# Patient Record
Sex: MALE | Race: WHITE | NOT HISPANIC OR LATINO | URBAN - METROPOLITAN AREA
[De-identification: names, ages, dates, MRNs, and addresses within clinical notes are randomized per-mention and may not be internally consistent; named-entity substitution may affect disease eponyms.]

---

## 2023-08-11 VITALS
TEMPERATURE: 98 F | SYSTOLIC BLOOD PRESSURE: 113 MMHG | WEIGHT: 125 LBS | HEART RATE: 90 BPM | DIASTOLIC BLOOD PRESSURE: 70 MMHG | RESPIRATION RATE: 18 BRPM | OXYGEN SATURATION: 94 %

## 2023-08-11 LAB
ALBUMIN SERPL ELPH-MCNC: 3.6 G/DL — SIGNIFICANT CHANGE UP (ref 3.4–5)
ALP SERPL-CCNC: 116 U/L — SIGNIFICANT CHANGE UP (ref 40–120)
ALT FLD-CCNC: 49 U/L — HIGH (ref 12–42)
ANION GAP SERPL CALC-SCNC: 11 MMOL/L — SIGNIFICANT CHANGE UP (ref 9–16)
ANION GAP SERPL CALC-SCNC: 13 MMOL/L — SIGNIFICANT CHANGE UP (ref 9–16)
APPEARANCE UR: CLEAR — SIGNIFICANT CHANGE UP
AST SERPL-CCNC: 58 U/L — HIGH (ref 15–37)
BACTERIA # UR AUTO: ABNORMAL /HPF
BASOPHILS # BLD AUTO: 0.03 K/UL — SIGNIFICANT CHANGE UP (ref 0–0.2)
BASOPHILS NFR BLD AUTO: 0.4 % — SIGNIFICANT CHANGE UP (ref 0–2)
BILIRUB SERPL-MCNC: 0.9 MG/DL — SIGNIFICANT CHANGE UP (ref 0.2–1.2)
BILIRUB UR-MCNC: NEGATIVE — SIGNIFICANT CHANGE UP
BUN SERPL-MCNC: 46 MG/DL — HIGH (ref 7–23)
BUN SERPL-MCNC: 46 MG/DL — HIGH (ref 7–23)
CALCIUM SERPL-MCNC: 8.4 MG/DL — LOW (ref 8.5–10.5)
CALCIUM SERPL-MCNC: 9.1 MG/DL — SIGNIFICANT CHANGE UP (ref 8.5–10.5)
CHLORIDE SERPL-SCNC: 101 MMOL/L — SIGNIFICANT CHANGE UP (ref 96–108)
CHLORIDE SERPL-SCNC: 98 MMOL/L — SIGNIFICANT CHANGE UP (ref 96–108)
CO2 SERPL-SCNC: 23 MMOL/L — SIGNIFICANT CHANGE UP (ref 22–31)
CO2 SERPL-SCNC: 24 MMOL/L — SIGNIFICANT CHANGE UP (ref 22–31)
COD CRY URNS QL: SIGNIFICANT CHANGE UP
COLOR SPEC: YELLOW — SIGNIFICANT CHANGE UP
CREAT SERPL-MCNC: 1.81 MG/DL — HIGH (ref 0.5–1.3)
CREAT SERPL-MCNC: 2.15 MG/DL — HIGH (ref 0.5–1.3)
DIFF PNL FLD: ABNORMAL
EGFR: 31 ML/MIN/1.73M2 — LOW
EGFR: 38 ML/MIN/1.73M2 — LOW
EOSINOPHIL # BLD AUTO: 0 K/UL — SIGNIFICANT CHANGE UP (ref 0–0.5)
EOSINOPHIL NFR BLD AUTO: 0 % — SIGNIFICANT CHANGE UP (ref 0–6)
EPI CELLS # UR: SIGNIFICANT CHANGE UP
FLUAV AG NPH QL: SIGNIFICANT CHANGE UP
FLUBV AG NPH QL: SIGNIFICANT CHANGE UP
GLUCOSE SERPL-MCNC: 101 MG/DL — HIGH (ref 70–99)
GLUCOSE SERPL-MCNC: 133 MG/DL — HIGH (ref 70–99)
GLUCOSE UR QL: NEGATIVE MG/DL — SIGNIFICANT CHANGE UP
GRAN CASTS # UR COMP ASSIST: SIGNIFICANT CHANGE UP
HCT VFR BLD CALC: 42.3 % — SIGNIFICANT CHANGE UP (ref 39–50)
HGB BLD-MCNC: 14.1 G/DL — SIGNIFICANT CHANGE UP (ref 13–17)
HYALINE CASTS # UR AUTO: PRESENT
IMM GRANULOCYTES NFR BLD AUTO: 0.2 % — SIGNIFICANT CHANGE UP (ref 0–0.9)
KETONES UR-MCNC: NEGATIVE MG/DL — SIGNIFICANT CHANGE UP
LEUKOCYTE ESTERASE UR-ACNC: NEGATIVE — SIGNIFICANT CHANGE UP
LIDOCAIN IGE QN: 130 U/L — SIGNIFICANT CHANGE UP (ref 73–393)
LYMPHOCYTES # BLD AUTO: 1.24 K/UL — SIGNIFICANT CHANGE UP (ref 1–3.3)
LYMPHOCYTES # BLD AUTO: 15.1 % — SIGNIFICANT CHANGE UP (ref 13–44)
MAGNESIUM SERPL-MCNC: 1.8 MG/DL — SIGNIFICANT CHANGE UP (ref 1.6–2.6)
MCHC RBC-ENTMCNC: 32.2 PG — SIGNIFICANT CHANGE UP (ref 27–34)
MCHC RBC-ENTMCNC: 33.3 GM/DL — SIGNIFICANT CHANGE UP (ref 32–36)
MCV RBC AUTO: 96.6 FL — SIGNIFICANT CHANGE UP (ref 80–100)
MONOCYTES # BLD AUTO: 0.74 K/UL — SIGNIFICANT CHANGE UP (ref 0–0.9)
MONOCYTES NFR BLD AUTO: 9 % — SIGNIFICANT CHANGE UP (ref 2–14)
NEUTROPHILS # BLD AUTO: 6.2 K/UL — SIGNIFICANT CHANGE UP (ref 1.8–7.4)
NEUTROPHILS NFR BLD AUTO: 75.3 % — SIGNIFICANT CHANGE UP (ref 43–77)
NITRITE UR-MCNC: NEGATIVE — SIGNIFICANT CHANGE UP
NRBC # BLD: 0 /100 WBCS — SIGNIFICANT CHANGE UP (ref 0–0)
NT-PROBNP SERPL-SCNC: 2513 PG/ML — HIGH
PH UR: 5 — SIGNIFICANT CHANGE UP (ref 5–8)
PLATELET # BLD AUTO: 169 K/UL — SIGNIFICANT CHANGE UP (ref 150–400)
POTASSIUM SERPL-MCNC: 4.7 MMOL/L — SIGNIFICANT CHANGE UP (ref 3.5–5.3)
POTASSIUM SERPL-MCNC: 5.4 MMOL/L — HIGH (ref 3.5–5.3)
POTASSIUM SERPL-SCNC: 4.7 MMOL/L — SIGNIFICANT CHANGE UP (ref 3.5–5.3)
POTASSIUM SERPL-SCNC: 5.4 MMOL/L — HIGH (ref 3.5–5.3)
PROT SERPL-MCNC: 7.4 G/DL — SIGNIFICANT CHANGE UP (ref 6.4–8.2)
PROT UR-MCNC: 100 MG/DL
RBC # BLD: 4.38 M/UL — SIGNIFICANT CHANGE UP (ref 4.2–5.8)
RBC # FLD: 13.2 % — SIGNIFICANT CHANGE UP (ref 10.3–14.5)
RBC CASTS # UR COMP ASSIST: >10 /HPF — HIGH (ref 0–4)
RSV RNA NPH QL NAA+NON-PROBE: DETECTED
SARS-COV-2 RNA SPEC QL NAA+PROBE: SIGNIFICANT CHANGE UP
SODIUM SERPL-SCNC: 135 MMOL/L — SIGNIFICANT CHANGE UP (ref 132–145)
SODIUM SERPL-SCNC: 135 MMOL/L — SIGNIFICANT CHANGE UP (ref 132–145)
SP GR SPEC: 1.01 — SIGNIFICANT CHANGE UP (ref 1–1.03)
TRI-PHOS CRY UR QL COMP ASSIST: SIGNIFICANT CHANGE UP
TROPONIN I, HIGH SENSITIVITY RESULT: 15.9 NG/L — SIGNIFICANT CHANGE UP
URATE CRY FLD QL MICRO: SIGNIFICANT CHANGE UP
UROBILINOGEN FLD QL: 1 MG/DL — SIGNIFICANT CHANGE UP (ref 0.2–1)
WBC # BLD: 8.23 K/UL — SIGNIFICANT CHANGE UP (ref 3.8–10.5)
WBC # FLD AUTO: 8.23 K/UL — SIGNIFICANT CHANGE UP (ref 3.8–10.5)
WBC UR QL: 0 /HPF — SIGNIFICANT CHANGE UP (ref 0–5)

## 2023-08-11 PROCEDURE — 70450 CT HEAD/BRAIN W/O DYE: CPT | Mod: 26,MH

## 2023-08-11 PROCEDURE — 71045 X-RAY EXAM CHEST 1 VIEW: CPT | Mod: 26

## 2023-08-11 PROCEDURE — 99285 EMERGENCY DEPT VISIT HI MDM: CPT | Mod: FS

## 2023-08-11 RX ORDER — IPRATROPIUM/ALBUTEROL SULFATE 18-103MCG
3 AEROSOL WITH ADAPTER (GRAM) INHALATION ONCE
Refills: 0 | Status: COMPLETED | OUTPATIENT
Start: 2023-08-11 | End: 2023-08-11

## 2023-08-11 RX ORDER — SODIUM CHLORIDE 9 MG/ML
500 INJECTION INTRAMUSCULAR; INTRAVENOUS; SUBCUTANEOUS ONCE
Refills: 0 | Status: COMPLETED | OUTPATIENT
Start: 2023-08-11 | End: 2023-08-11

## 2023-08-11 NOTE — ED PROVIDER NOTE - NSICDXPASTMEDICALHX_GEN_ALL_CORE_FT
PAST MEDICAL HISTORY:  Afib     COPD, mild     HTN (hypertension)      PAST MEDICAL HISTORY:  Afib     COPD, moderate on home O2    HTN (hypertension)     Pre-diabetes     Renal cancer     Supplemental oxygen dependent

## 2023-08-11 NOTE — ED PROVIDER NOTE - ATTENDING APP SHARED VISIT CONTRIBUTION OF CARE
78-year-old man presenting with increased shortness of breath cough and malaise.  He has a history of COPD and is on home O2.  Through his ED work-up he was diagnosed with RSV.  Feels too short of breath and weak to be discharged.  Plan to admit.

## 2023-08-11 NOTE — ED PROVIDER NOTE - NS ED ATTENDING STATEMENT MOD
This was a shared visit with the DEMI. I reviewed and verified the documentation and independently performed the documented:

## 2023-08-11 NOTE — ED ADULT NURSE NOTE - OBJECTIVE STATEMENT
Reports that he felt fatigued after pushing himself to do errands. On home O2, currently on 2LNC. Placed on cardiac monitor.

## 2023-08-11 NOTE — ED ADULT NURSE NOTE - NSFALLUNIVINTERV_ED_ALL_ED
Bed/Stretcher in lowest position, wheels locked, appropriate side rails in place/Call bell, personal items and telephone in reach/Instruct patient to call for assistance before getting out of bed/chair/stretcher/Non-slip footwear applied when patient is off stretcher/[place] to call system/Physically safe environment - no spills, clutter or unnecessary equipment/Purposeful proactive rounding/Room/bathroom lighting operational, light cord in reach

## 2023-08-11 NOTE — ED PROVIDER NOTE - OBJECTIVE STATEMENT
79 y/o Male with PMHx of Afib(on Eliquis and metoprolol), HLD, COPD(on 2L nasal canula), and HTN presenting today c/o feeling weak and having episode of dizziness for the past week. Patient states that he's been having on and off episodes of feeling weak. Patient also reports cough with yellow sputum and runny nose for the past couple of days. Patient endorses increasing episodes of SOB mainly when exercising. Patient states that the symptoms started after the 90 degree whether last week but states that the symptoms haven't improved. Denies HA, chest pain, recent travel or sick contact, and fever. Patient states he didn't go from a sitting to standing position while the dizziness episode was occurring and describes the dizziness sensation  as room spinning. Patient states that the dizziness has improved while in the ED. 77 y/o Male with PMHx of Afib(on Eliquis and metoprolol), HLD, COPD (on 2L nasal canula), and HTN presenting today c/o feeling weak and having episode of dizziness for the past week. Patient states that he's been having on and off episodes of feeling weak. Patient also reports cough with yellow sputum and runny nose for the past couple of days. Patient endorses increasing episodes of SOB mainly when exercising. Patient states that the symptoms started after the 90 degree whether last week but states that the symptoms haven't improved. Denies HA, chest pain, recent travel or sick contact, and fever. Patient states he didn't go from a sitting to standing position while the dizziness episode was occurring and describes the dizziness sensation  as room spinning. Patient states that the dizziness has improved while in the ED.

## 2023-08-12 ENCOUNTER — INPATIENT (INPATIENT)
Facility: HOSPITAL | Age: 78
LOS: 3 days | Discharge: NURSING, REHAB OR LONG-TERM CARE | DRG: 152 | End: 2023-08-16
Attending: INTERNAL MEDICINE | Admitting: STUDENT IN AN ORGANIZED HEALTH CARE EDUCATION/TRAINING PROGRAM
Payer: MEDICARE

## 2023-08-12 DIAGNOSIS — R53.1 WEAKNESS: ICD-10-CM

## 2023-08-12 DIAGNOSIS — R31.9 HEMATURIA, UNSPECIFIED: ICD-10-CM

## 2023-08-12 DIAGNOSIS — B33.8 OTHER SPECIFIED VIRAL DISEASES: ICD-10-CM

## 2023-08-12 DIAGNOSIS — E78.5 HYPERLIPIDEMIA, UNSPECIFIED: ICD-10-CM

## 2023-08-12 DIAGNOSIS — Z90.5 ACQUIRED ABSENCE OF KIDNEY: Chronic | ICD-10-CM

## 2023-08-12 DIAGNOSIS — Z29.9 ENCOUNTER FOR PROPHYLACTIC MEASURES, UNSPECIFIED: ICD-10-CM

## 2023-08-12 DIAGNOSIS — I10 ESSENTIAL (PRIMARY) HYPERTENSION: ICD-10-CM

## 2023-08-12 DIAGNOSIS — I48.91 UNSPECIFIED ATRIAL FIBRILLATION: ICD-10-CM

## 2023-08-12 DIAGNOSIS — N17.9 ACUTE KIDNEY FAILURE, UNSPECIFIED: ICD-10-CM

## 2023-08-12 DIAGNOSIS — N40.0 BENIGN PROSTATIC HYPERPLASIA WITHOUT LOWER URINARY TRACT SYMPTOMS: ICD-10-CM

## 2023-08-12 DIAGNOSIS — J44.9 CHRONIC OBSTRUCTIVE PULMONARY DISEASE, UNSPECIFIED: ICD-10-CM

## 2023-08-12 LAB
ALBUMIN SERPL ELPH-MCNC: 4 G/DL — SIGNIFICANT CHANGE UP (ref 3.3–5)
ALP SERPL-CCNC: 101 U/L — SIGNIFICANT CHANGE UP (ref 40–120)
ALT FLD-CCNC: 33 U/L — SIGNIFICANT CHANGE UP (ref 10–45)
ANION GAP SERPL CALC-SCNC: 11 MMOL/L — SIGNIFICANT CHANGE UP (ref 5–17)
ANION GAP SERPL CALC-SCNC: 13 MMOL/L — SIGNIFICANT CHANGE UP (ref 5–17)
ANISOCYTOSIS BLD QL: SLIGHT — SIGNIFICANT CHANGE UP
AST SERPL-CCNC: 42 U/L — HIGH (ref 10–40)
BASOPHILS # BLD AUTO: 0 K/UL — SIGNIFICANT CHANGE UP (ref 0–0.2)
BASOPHILS NFR BLD AUTO: 0 % — SIGNIFICANT CHANGE UP (ref 0–2)
BILIRUB SERPL-MCNC: 0.6 MG/DL — SIGNIFICANT CHANGE UP (ref 0.2–1.2)
BUN SERPL-MCNC: 44 MG/DL — HIGH (ref 7–23)
BUN SERPL-MCNC: 48 MG/DL — HIGH (ref 7–23)
BURR CELLS BLD QL SMEAR: PRESENT — SIGNIFICANT CHANGE UP
CALCIUM SERPL-MCNC: 8.8 MG/DL — SIGNIFICANT CHANGE UP (ref 8.4–10.5)
CALCIUM SERPL-MCNC: 9.3 MG/DL — SIGNIFICANT CHANGE UP (ref 8.4–10.5)
CHLORIDE SERPL-SCNC: 101 MMOL/L — SIGNIFICANT CHANGE UP (ref 96–108)
CHLORIDE SERPL-SCNC: 102 MMOL/L — SIGNIFICANT CHANGE UP (ref 96–108)
CO2 SERPL-SCNC: 21 MMOL/L — LOW (ref 22–31)
CO2 SERPL-SCNC: 24 MMOL/L — SIGNIFICANT CHANGE UP (ref 22–31)
CREAT SERPL-MCNC: 1.49 MG/DL — HIGH (ref 0.5–1.3)
CREAT SERPL-MCNC: 1.49 MG/DL — HIGH (ref 0.5–1.3)
EGFR: 48 ML/MIN/1.73M2 — LOW
EGFR: 48 ML/MIN/1.73M2 — LOW
EOSINOPHIL # BLD AUTO: 0 K/UL — SIGNIFICANT CHANGE UP (ref 0–0.5)
EOSINOPHIL NFR BLD AUTO: 0 % — SIGNIFICANT CHANGE UP (ref 0–6)
GIANT PLATELETS BLD QL SMEAR: PRESENT — SIGNIFICANT CHANGE UP
GLUCOSE SERPL-MCNC: 178 MG/DL — HIGH (ref 70–99)
GLUCOSE SERPL-MCNC: 247 MG/DL — HIGH (ref 70–99)
HCT VFR BLD CALC: 42.3 % — SIGNIFICANT CHANGE UP (ref 39–50)
HGB BLD-MCNC: 14.6 G/DL — SIGNIFICANT CHANGE UP (ref 13–17)
LYMPHOCYTES # BLD AUTO: 0.77 K/UL — LOW (ref 1–3.3)
LYMPHOCYTES # BLD AUTO: 13.9 % — SIGNIFICANT CHANGE UP (ref 13–44)
MACROCYTES BLD QL: SLIGHT — SIGNIFICANT CHANGE UP
MAGNESIUM SERPL-MCNC: 1.9 MG/DL — SIGNIFICANT CHANGE UP (ref 1.6–2.6)
MANUAL SMEAR VERIFICATION: SIGNIFICANT CHANGE UP
MCHC RBC-ENTMCNC: 33 PG — SIGNIFICANT CHANGE UP (ref 27–34)
MCHC RBC-ENTMCNC: 34.5 GM/DL — SIGNIFICANT CHANGE UP (ref 32–36)
MCV RBC AUTO: 95.5 FL — SIGNIFICANT CHANGE UP (ref 80–100)
MONOCYTES # BLD AUTO: 0.05 K/UL — SIGNIFICANT CHANGE UP (ref 0–0.9)
MONOCYTES NFR BLD AUTO: 0.9 % — LOW (ref 2–14)
NEUTROPHILS # BLD AUTO: 4.74 K/UL — SIGNIFICANT CHANGE UP (ref 1.8–7.4)
NEUTROPHILS NFR BLD AUTO: 85.2 % — HIGH (ref 43–77)
OVALOCYTES BLD QL SMEAR: SLIGHT — SIGNIFICANT CHANGE UP
PHOSPHATE SERPL-MCNC: 3.8 MG/DL — SIGNIFICANT CHANGE UP (ref 2.5–4.5)
PLAT MORPH BLD: ABNORMAL
PLATELET # BLD AUTO: 158 K/UL — SIGNIFICANT CHANGE UP (ref 150–400)
POIKILOCYTOSIS BLD QL AUTO: SIGNIFICANT CHANGE UP
POTASSIUM SERPL-MCNC: 4.9 MMOL/L — SIGNIFICANT CHANGE UP (ref 3.5–5.3)
POTASSIUM SERPL-MCNC: 5.6 MMOL/L — HIGH (ref 3.5–5.3)
POTASSIUM SERPL-SCNC: 4.9 MMOL/L — SIGNIFICANT CHANGE UP (ref 3.5–5.3)
POTASSIUM SERPL-SCNC: 5.6 MMOL/L — HIGH (ref 3.5–5.3)
PROT SERPL-MCNC: 6.6 G/DL — SIGNIFICANT CHANGE UP (ref 6–8.3)
RBC # BLD: 4.43 M/UL — SIGNIFICANT CHANGE UP (ref 4.2–5.8)
RBC # FLD: 13 % — SIGNIFICANT CHANGE UP (ref 10.3–14.5)
RBC BLD AUTO: ABNORMAL
SODIUM SERPL-SCNC: 135 MMOL/L — SIGNIFICANT CHANGE UP (ref 135–145)
SODIUM SERPL-SCNC: 137 MMOL/L — SIGNIFICANT CHANGE UP (ref 135–145)
WBC # BLD: 5.56 K/UL — SIGNIFICANT CHANGE UP (ref 3.8–10.5)
WBC # FLD AUTO: 5.56 K/UL — SIGNIFICANT CHANGE UP (ref 3.8–10.5)

## 2023-08-12 PROCEDURE — 99223 1ST HOSP IP/OBS HIGH 75: CPT | Mod: GC

## 2023-08-12 RX ORDER — METOPROLOL TARTRATE 50 MG
5 TABLET ORAL ONCE
Refills: 0 | Status: COMPLETED | OUTPATIENT
Start: 2023-08-12 | End: 2023-08-12

## 2023-08-12 RX ORDER — METOPROLOL TARTRATE 50 MG
1 TABLET ORAL
Refills: 0 | DISCHARGE

## 2023-08-12 RX ORDER — TAMSULOSIN HYDROCHLORIDE 0.4 MG/1
1 CAPSULE ORAL
Refills: 0 | DISCHARGE

## 2023-08-12 RX ORDER — METOPROLOL TARTRATE 50 MG
25 TABLET ORAL EVERY 12 HOURS
Refills: 0 | Status: DISCONTINUED | OUTPATIENT
Start: 2023-08-12 | End: 2023-08-16

## 2023-08-12 RX ORDER — IPRATROPIUM/ALBUTEROL SULFATE 18-103MCG
3 AEROSOL WITH ADAPTER (GRAM) INHALATION EVERY 6 HOURS
Refills: 0 | Status: DISCONTINUED | OUTPATIENT
Start: 2023-08-12 | End: 2023-08-16

## 2023-08-12 RX ORDER — TAMSULOSIN HYDROCHLORIDE 0.4 MG/1
0.4 CAPSULE ORAL AT BEDTIME
Refills: 0 | Status: DISCONTINUED | OUTPATIENT
Start: 2023-08-12 | End: 2023-08-16

## 2023-08-12 RX ORDER — APIXABAN 2.5 MG/1
1 TABLET, FILM COATED ORAL
Refills: 0 | DISCHARGE

## 2023-08-12 RX ORDER — IPRATROPIUM/ALBUTEROL SULFATE 18-103MCG
3 AEROSOL WITH ADAPTER (GRAM) INHALATION EVERY 6 HOURS
Refills: 0 | Status: DISCONTINUED | OUTPATIENT
Start: 2023-08-12 | End: 2023-08-12

## 2023-08-12 RX ORDER — APIXABAN 2.5 MG/1
2.5 TABLET, FILM COATED ORAL EVERY 12 HOURS
Refills: 0 | Status: DISCONTINUED | OUTPATIENT
Start: 2023-08-12 | End: 2023-08-16

## 2023-08-12 RX ORDER — SODIUM ZIRCONIUM CYCLOSILICATE 10 G/10G
10 POWDER, FOR SUSPENSION ORAL ONCE
Refills: 0 | Status: COMPLETED | OUTPATIENT
Start: 2023-08-12 | End: 2023-08-12

## 2023-08-12 RX ORDER — ATORVASTATIN CALCIUM 80 MG/1
1 TABLET, FILM COATED ORAL
Refills: 0 | DISCHARGE

## 2023-08-12 RX ORDER — SODIUM CHLORIDE 9 MG/ML
1000 INJECTION INTRAMUSCULAR; INTRAVENOUS; SUBCUTANEOUS
Refills: 0 | Status: DISCONTINUED | OUTPATIENT
Start: 2023-08-12 | End: 2023-08-14

## 2023-08-12 RX ORDER — ATORVASTATIN CALCIUM 80 MG/1
80 TABLET, FILM COATED ORAL AT BEDTIME
Refills: 0 | Status: DISCONTINUED | OUTPATIENT
Start: 2023-08-12 | End: 2023-08-14

## 2023-08-12 RX ADMIN — Medication 25 MILLIGRAM(S): at 12:07

## 2023-08-12 RX ADMIN — SODIUM CHLORIDE 500 MILLILITER(S): 9 INJECTION INTRAMUSCULAR; INTRAVENOUS; SUBCUTANEOUS at 00:09

## 2023-08-12 RX ADMIN — Medication 100 MILLIGRAM(S): at 22:06

## 2023-08-12 RX ADMIN — Medication 25 MILLIGRAM(S): at 23:07

## 2023-08-12 RX ADMIN — SODIUM CHLORIDE 80 MILLILITER(S): 9 INJECTION INTRAMUSCULAR; INTRAVENOUS; SUBCUTANEOUS at 12:06

## 2023-08-12 RX ADMIN — SODIUM ZIRCONIUM CYCLOSILICATE 10 GRAM(S): 10 POWDER, FOR SUSPENSION ORAL at 12:58

## 2023-08-12 RX ADMIN — Medication 5 MILLIGRAM(S): at 05:17

## 2023-08-12 RX ADMIN — TAMSULOSIN HYDROCHLORIDE 0.4 MILLIGRAM(S): 0.4 CAPSULE ORAL at 22:07

## 2023-08-12 RX ADMIN — ATORVASTATIN CALCIUM 80 MILLIGRAM(S): 80 TABLET, FILM COATED ORAL at 22:06

## 2023-08-12 RX ADMIN — Medication 3 MILLILITER(S): at 00:05

## 2023-08-12 RX ADMIN — APIXABAN 2.5 MILLIGRAM(S): 2.5 TABLET, FILM COATED ORAL at 12:06

## 2023-08-12 RX ADMIN — Medication 125 MILLIGRAM(S): at 00:05

## 2023-08-12 RX ADMIN — APIXABAN 2.5 MILLIGRAM(S): 2.5 TABLET, FILM COATED ORAL at 22:06

## 2023-08-12 NOTE — H&P ADULT - PROBLEM SELECTOR PLAN 3
- Eliquis 2.5mg BID  - metoprolol tartrate 25mg BID on home 2LNC, no evidence of hypoxia.  - ramiro PRN UA >10 RBC and large blood in urine likely in the setting of previous renal cancer. Pt is not anemic so there is no indication to hold AC at this time.   - Trend CBC   - Continue w/ home Eliquis 2.5 mg BID

## 2023-08-12 NOTE — H&P ADULT - ATTENDING COMMENTS
Patient is 79 yo man with A. Fib on AC, COPD on 2 L of Oxygen via NC, HTN, Renal Ca s/p partial R nephrectomy, BPH, admitted with complains of fatigue and decreased PO intake. In ED pt was hypotensive to 110/70 and improved with NS bolus to 150/80. Patient appears thin with decreased muscle muss. Not in respiratory distress with clear to ausculation lungs but decreased air entry. CXR was clear. EKG with A. Fib. RVP returned positive for RSV.  ----for asymptomatic RSV will cont with supportive care. No Patient is 79 yo man with A. Fib on AC, COPD on 2 L of Oxygen via NC, HTN, Renal Ca s/p partial R nephrectomy, BPH, admitted with complains of fatigue and decreased PO intake. In ED pt was hypotensive to 110/70 and improved with NS bolus to 150/80. Patient appears thin with decreased muscle muss. Not in respiratory distress with clear to ausculation lungs but decreased air entry. CXR was clear. EKG with A. Fib. RVP returned positive for RSV. Labs significant for elevated BUN/Creat.   ----for asymptomatic RSV will cont with supportive care. No need for systemic steroid therapy  ---hypoxic respiratory failure due to COPD, cont with supplemental oxygen and bronchodilators  ----for BONNY, will r/out post-renal cause with bladder scan given history of BPH. Cont daily tamsulosin However pt improved with IV bolus thus suspect more pre-renal etiology. Given patient's hesitancy with drinking water will cont with IV infusion for another 24 hours.   ----A. Fib on AC and beta blockers. Patient noted to have hematuria but HgB is stable; likely has to do with history of renal cancer that needs to be followed as outpatient  ----PT assessment prior to discharge likely tomorrow

## 2023-08-12 NOTE — H&P ADULT - PROBLEM SELECTOR PLAN 6
- tamsulosin 0.4mg daily - atorvastatin 80mg daily Known PMH AFib, rate controlled.  - continue home Eliquis 2.5mg BID  - continue home metoprolol tartrate 25mg BID

## 2023-08-12 NOTE — H&P ADULT - NSHPPHYSICALEXAM_GEN_ALL_CORE
General: in no acute distress  Eyes: EOMI intact bilaterally. Anicteric sclerae, moist conjunctivae  HENT: Moist mucous membranes  Neck: Trachea midline, supple  Lungs: CTA B/L. No wheezes, rales, or rhonchi  Cardiovascular: RRR. No murmurs, rubs, or gallops  Abdomen: Soft, non-tender non-distended; No rebound or guarding  Extremities: WWP, No clubbing, cyanosis or edema  MSK: No midline bony tenderness. No CVA tenderness bilaterally  Neurological: Alert and oriented x3  Skin: Warm and dry. No obvious rash no General: in no acute distress  Eyes: EOMI intact bilaterally; Anicteric sclerae  HENT: Dry mucous membranes  Neck: Trachea midline, supple  Lungs: CTA B/L. No wheezes, rales, or rhonchi  Cardiovascular: RRR. No murmurs, rubs, or gallops  Abdomen: Soft, non-tender non-distended; No rebound or guarding  Extremities: +calf tenderness b/l; No clubbing, cyanosis or edema  MSK: +CVA tenderness bilaterally  Neurological: Alert and oriented x3  Skin: Warm and dry. No obvious rash General: in no acute distress  Eyes: EOMI intact bilaterally; Anicteric sclerae  HENT: Dry mucous membranes  Neck: Trachea midline, supple  Lungs: CTA B/L. No wheezes, rales, or rhonchi  Cardiovascular: RRR. No murmurs, rubs, or gallops  Abdomen: Soft, non-tender non-distended; No rebound or guarding  Extremities: no calf tenderness b/l; No clubbing, cyanosis or edema  MSK: no CVA tenderness bilaterally  Neurological: Alert and oriented x3  Skin: Warm and dry. No obvious rash General: in no acute distress  Eyes: EOMI intact bilaterally; Anicteric sclerae  HENT: Dry mucous membranes  Neck: Trachea midline, supple  Lungs: Diminished lung sounds, CTA B/L. No wheezes, rales, or rhonchi  Cardiovascular: RRR. No murmurs, rubs, or gallops  Abdomen: Soft, non-tender non-distended; No rebound or guarding  Extremities: no calf tenderness b/l; No clubbing, cyanosis or edema  MSK: no CVA tenderness bilaterally  Neurological: Alert and oriented x3  Skin: Warm and dry. No obvious rash

## 2023-08-12 NOTE — H&P ADULT - PROBLEM SELECTOR PLAN 4
- holding losartan 50mg daily ISO BONNY - Eliquis 2.5mg BID  - metoprolol tartrate 25mg BID History of one week of weakness and fatigue. Pt appears malnourished. States he completes ADLs w/o assistance. Uses a shopping cart to help steady his walking when doing errands.   - Encourage PO intake   - IVF  - PT consult

## 2023-08-12 NOTE — H&P ADULT - NSHPREVIEWOFSYSTEMS_GEN_ALL_CORE
REVIEW OF SYSTEMS:    CONSTITUTIONAL: +weakness, no fevers or chills  EYES/ENT: +dizziness, +visual changes;  No vertigo or throat pain   NECK: No pain or stiffness  RESPIRATORY: +cough, no wheezing, hemoptysis; No shortness of breath  CARDIOVASCULAR: No chest pain or palpitations  GASTROINTESTINAL: No abdominal or epigastric pain. No nausea, vomiting, or hematemesis; No diarrhea or constipation. No melena or hematochezia.  GENITOURINARY: No dysuria, frequency or hematuria  NEUROLOGICAL: No numbness or weakness  SKIN: No itching, rashes

## 2023-08-12 NOTE — H&P ADULT - PROBLEM SELECTOR PLAN 2
BUN/Cr 46, 2.15 on admission. Downtrending s/p 500cc NS in ED.  suspect pre-renal in nature. no known kidney disease, however, UA showing protein and RBC/blood.  - trend Cr  - urine studies to calculate FeNa  - encourage PO intake  - avoid nephrotoxic drugs, renally dose medications  - hold losartan ISO BONNY BUN/Cr 46, 2.15 on admission. History of renal cancer and rt partial nephrectomy. Downtrending s/p 500cc NS in ED.  suspect pre-renal in nature. no known kidney disease, however, UA showing protein and RBC/blood.  - trend Cr  - urine studies to calculate FeNa  - encourage PO intake  - avoid nephrotoxic drugs, renally dose medications BUN/Cr 46, 2.15 on admission. History of renal cancer and rt partial nephrectomy. Downtrending (2.15 --> 1.81) s/p 500cc NS in ED.  suspect pre-renal in nature. no known kidney disease, however, UA showing protein and RBC/blood.  - trend Cr  - bladder scan to r/o obstruction  - NS 80 cc/hr for 24 hr  - encourage PO intake  - avoid nephrotoxic drugs, renally dose medications BUN/Cr 46, 2.15 on admission. History of renal cancer and rt partial nephrectomy. Downtrending (2.15 --> 1.81) s/p 500cc NS in ED.  suspect pre-renal in nature. no known kidney disease, however, UA showing protein and RBC/blood.   - trend Cr  - bladder scan to r/o obstruction  - NS 80 cc/hr for 24 hr  - encourage PO intake  - avoid nephrotoxic drugs, renally dose medications

## 2023-08-12 NOTE — ED ADULT NURSE REASSESSMENT NOTE - NS ED NURSE REASSESS COMMENT FT1
Pt received from ANGELA Cruz. Pt ambulated to bathroom with steady gait. Placed on continuos cardiac monitoring and pulse oximetry. Maintained on 3L NC. Pending transfer to [facility].

## 2023-08-12 NOTE — H&P ADULT - NSHPLABSRESULTS_GEN_ALL_CORE
LABS:                         14.1   8.23  >-----< 169           ( 08-11-23 @ 14:53 )             42.3       135  |  101  |   46  ----------------------< 101    (08-11-23 @ 21:58)     4.7  |  23  |  1.81    Anion Gap: 11  ,   135  |  98  |   46  ----------------------< 133    (08-11-23 @ 14:53)     5.4  |  24  |  2.15    Anion Gap: 13    Ca   8.4   (08-11-23 @ 21:58)  Mg   x    (08-11-23 @ 21:58)  Phos x    (08-11-23 @ 21:58)       TP 8.4     |  AST x     -------------------------     Alb x     |  ALT x               (08-11-23 @ 21:58)  -------------------------     T-bili x   |  AlkPh x     D-bili x          Urinalysis Basic - ( 11 Aug 2023 21:58 )    Color: x / Appearance: x / SG: x / pH: x  Gluc: 101 mg/dL / Ketone: x  / Bili: x / Urobili: x   Blood: x / Protein: x / Nitrite: x   Leuk Esterase: x / RBC: x / WBC x   Sq Epi: x / Non Sq Epi: x / Bacteria: x      I/O SUMMARY:

## 2023-08-12 NOTE — H&P ADULT - ASSESSMENT
incomplete note 77 y/o Male with PMHx of Afib(on Eliquis and metoprolol), HLD, COPD (on 2L nasal canula), renal cancer s/p partial rt nephrectomy and HTN presenting today c/o feeling weak for the past week admitted for further workup for weakness and management of RSV.

## 2023-08-12 NOTE — H&P ADULT - HISTORY OF PRESENT ILLNESS
77 y/o Male with PMHx of Afib(on Eliquis and metoprolol), HLD, COPD (on 2L nasal canula), and HTN presenting today c/o feeling weak and having episode of dizziness for the past week. Patient states that he's been having on and off episodes of feeling weak. Patient also reports cough with yellow sputum and runny nose for the past couple of days. Patient endorses increasing episodes of SOB mainly when exercising. Patient states that the symptoms started after the 90 degree whether last week but states that the symptoms haven't improved. Denies HA, chest pain, recent travel or sick contact, and fever. Patient states he didn't go from a sitting to standing position while the dizziness episode was occurring and describes the dizziness sensation  as room spinning. Patient states that the dizziness has improved while in the ED. 77 y/o Male with PMHx of Afib(on Eliquis and metoprolol), HLD, COPD (on 2L nasal canula), and HTN presenting today c/o feeling weak and having episode of dizziness for the past week. Patient states that he's been having on and off episodes of feeling weak. Patient also reports cough with yellow sputum and runny nose for the past couple of days. Patient endorses increasing episodes of SOB mainly when exercising. Patient states that the symptoms started after the 90 degree whether last week but states that the symptoms haven't improved. Denies HA, chest pain, recent travel or sick contact, and fever. Patient states he didn't go from a sitting to standing position while the dizziness episode was occurring and describes the dizziness sensation  as room spinning. Patient states that the dizziness has improved while in the ED.    In the ED:    - VS: Afebrile, HR: 90, BP: 113/70, RR: 18, O2: 94% on 2LNC     - Pertinent Labs: wbc 8.2, K 5.4 ->4.7, BUN 46, Cr 2.15, AST 58, ALT 49, trop I neg, pro-BNP 2513. UA = large blood, >10rbc, 100 protein    - Imaging:  ·	CT head:   ·	No acute intracranial hemorrhage, mass effect or demarcated territorial infarction  ·	CXR:   ·	Hyperaeration with emphysematous changes involving the upper lung zones (R>L)  ·	Pleural thickening and retraction of the right minor fissure consistent with RUL volume loss.    - Treatment/interventions: 500cc NS, lopressor 5mg IV, solumedrol 125mg, duoneb x1 79 y/o Male with PMHx of Afib(on Eliquis and metoprolol), HLD, COPD (on 2L nasal canula), renal cancer s/p partial rt nephrectomy and HTN presenting today c/o feeling weak for the past week. Patient states that he's been having on and off episodes of feeling weak since last Saturday 8/5. He was doing errands when the weakness began. States he has been eating and drinking water and tea regularly. States that he has samples of Trelegy and used a sample on Thursday evening after which he felt a build up of phlegm in his throat. Has had to use his home 02 more often and has had a cough with yellow sputum since then. On Friday he ate breakfast and drank tea before going to [employer] around 11 AM where he felt sick and had to sit down. Upon standing up he states he felt dizzy and while looking at the park "saw blurry images." The dizziness persisted for a half an hour after which he went to the hospital for further workup. Was wearing his 02 at the time. Denies fevers/chill, n/v, chest pain, abdomina pain and diarrhea. States that his symptoms have improved upon coming to the hospital.     In the ED:    - VS: Afebrile, HR: 90, BP: 113/70, RR: 18, O2: 94% on 2LNC     - Pertinent Labs: wbc 8.2, K 5.4 ->4.7, BUN 46, Cr 2.15, AST 58, ALT 49, trop I neg, pro-BNP 2513. UA = large blood, >10rbc, 100 protein    - Imaging:  ·	CT head:   ·	No acute intracranial hemorrhage, mass effect or demarcated territorial infarction  ·	CXR:   ·	Hyperaeration with emphysematous changes involving the upper lung zones (R>L)  ·	Pleural thickening and retraction of the right minor fissure consistent with RUL volume loss.    - Treatment/interventions: 500cc NS, lopressor 5mg IV, solumedrol 125mg, duoneb x1 79 y/o Male with PMHx of Afib(on Eliquis and metoprolol), HLD, COPD (on 2L nasal canula), renal cancer s/p partial rt nephrectomy and HTN presenting d/t feeling weak for the past week. Patient states that he's been having on and off episodes of feeling weak since last Saturday 8/5. He was doing errands when the weakness began. States he has been eating and drinking water and tea regularly. States that he has samples of Trelegy and used a sample on Thursday evening after which he felt a build up of phlegm in his throat. Has had to use his home 02 more often and has had a cough with yellow sputum since then. On Friday he ate breakfast and drank tea before going to [employer] around 11 AM where he felt sick and had to sit down. Upon standing up he states he felt dizzy and while looking at the park "saw blurry images." The dizziness persisted for a half an hour after which he went to the hospital for further workup. Was wearing his 02 at the time. Denies fevers/chill, n/v, chest pain, abdomina pain and diarrhea. States that his symptoms have improved upon coming to the hospital.     In the ED:    - VS: Afebrile, HR: 90, BP: 113/70, RR: 18, O2: 94% on 2LNC     - Pertinent Labs: wbc 8.2, K 5.4 ->4.7, BUN 46, Cr 2.15, AST 58, ALT 49, trop I neg, pro-BNP 2513. UA = large blood, >10rbc, 100 protein    - Imaging:  ·	CT head:   ·	No acute intracranial hemorrhage, mass effect or demarcated territorial infarction  ·	CXR:   ·	Hyperaeration with emphysematous changes involving the upper lung zones (R>L)  ·	Pleural thickening and retraction of the right minor fissure consistent with RUL volume loss.    - Treatment/interventions: 500cc NS, lopressor 5mg IV, solumedrol 125mg, duoneb x1 79 y/o Male with PMHx of Afib(on Eliquis and metoprolol), HLD, COPD (on 2L nasal canula), renal cancer s/p partial rt nephrectomy and HTN presenting d/t feeling weak for the past week. Patient states that he's been having on and off episodes of feeling weak since last Saturday 8/5. He was doing errands when the weakness began. States he has been eating and drinking water and tea regularly. States that he has samples of Trelegy and used a sample on Thursday evening after which he felt a build up of phlegm in his throat. Has had to use his home 02 more often and has had a cough with yellow sputum since then. On Friday he ate breakfast and drank tea before going to [employer] around 11 AM where he felt sick and had to sit down. Upon standing up he states he felt dizzy and while looking at the park "saw blurry images." The dizziness persisted for a half an hour after which he went to the hospital for further workup. Was wearing his 02 at the time. Denies fevers/chill, n/v, orthopnea, chest pain, abdomina pain and diarrhea. States that his symptoms have improved upon coming to the hospital.     In the ED:    - VS: Afebrile, HR: 90, BP: 113/70, RR: 18, O2: 94% on 2LNC     - Pertinent Labs: wbc 8.2, K 5.4 ->4.7, BUN 46, Cr 2.15, AST 58, ALT 49, trop I neg, pro-BNP 2513. UA = large blood, >10rbc, 100 protein    - Imaging:  ·	CT head:   ·	No acute intracranial hemorrhage, mass effect or demarcated territorial infarction  ·	CXR:   ·	Hyperaeration with emphysematous changes involving the upper lung zones (R>L)  ·	Pleural thickening and retraction of the right minor fissure consistent with RUL volume loss.    - Treatment/interventions: 500cc NS, lopressor 5mg IV, solumedrol 125mg, duoneb x1 77 y/o Male with PMHx of Afib(on Eliquis and metoprolol), HLD, COPD (on 2L nasal canula), renal cancer s/p partial rt nephrectomy and HTN presenting d/t feeling weak for the past week. Patient states that he's been having on and off episodes of feeling weak since last Saturday 8/5. He was doing errands when the weakness began. States he has been eating and drinking water and tea regularly. States that he has samples of Trelegy and used a sample on Thursday evening after which he felt a build up of phlegm in his throat. Has had increased SOB on exertion and has used his home 02 more often. Also has had a cough with yellow sputum since then. On Friday he ate breakfast and drank tea before going to [employer] around 11 AM where he felt sick and had to sit down. Upon standing up he states he felt dizzy and while looking at the park "saw blurry images." The dizziness persisted for a half an hour after which he went to the hospital for further workup. Was wearing his 02 at the time. Denies fevers/chill, n/v, orthopnea, chest pain, abdomina pain and diarrhea. States that his symptoms have improved upon coming to the hospital.     In the ED:    - VS: Afebrile, HR: 90, BP: 113/70, RR: 18, O2: 94% on 2LNC     - Pertinent Labs: wbc 8.2, K 5.4 ->4.7, BUN 46, Cr 2.15, AST 58, ALT 49, trop I neg, pro-BNP 2513. UA = large blood, >10rbc, 100 protein    - Imaging:  ·	CT head:   ·	No acute intracranial hemorrhage, mass effect or demarcated territorial infarction  ·	CXR:   ·	Hyperaeration with emphysematous changes involving the upper lung zones (R>L)  ·	Pleural thickening and retraction of the right minor fissure consistent with RUL volume loss.    - Treatment/interventions: 500cc NS, lopressor 5mg IV, solumedrol 125mg, duoneb x1 77 y/o Male with PMHx of Afib(on Eliquis and metoprolol), HLD, COPD (on 2L nasal canula), renal cancer s/p partial rt nephrectomy and HTN presenting d/t feeling weak for the past week. Patient states that he's been having on and off episodes of feeling weak since last Saturday 8/5. He was doing errands when the weakness began. States he has been eating and drinking water and tea regularly. States that he has samples of Trelegy and used a sample on Thursday evening after which he felt a build up of phlegm in his throat. Has had increased SOB on exertion and has used his home 02 more often. Also has had a cough with yellow sputum since then. On Friday he ate breakfast and drank tea before going to [employer] around 11 AM where he felt sick and had to sit down. Upon standing up he states he felt dizzy and while looking at the park "saw blurry images." The dizziness persisted for a half an hour after which he went to the hospital for further workup. Was wearing his 02 at the time. Denies fevers/chill, n/v, orthopnea, chest pain, abdomina pain and diarrhea. States that his symptoms have improved upon coming to the hospital.     In the ED:    - VS: Afebrile, HR: 90, BP: 113/70, RR: 18, O2: 94% on 2LNC     - Pertinent Labs: wbc 8.2, K 5.4 ->4.7, BUN 46, Cr 2.15, AST 58, ALT 49, trop I neg, pro-BNP 2513. UA = large blood, >10rbc, 100 protein, RSV +    - Imaging:  ·	CT head:   ·	No acute intracranial hemorrhage, mass effect or demarcated territorial infarction  ·	CXR:   ·	Hyperaeration with emphysematous changes involving the upper lung zones (R>L)  ·	Pleural thickening and retraction of the right minor fissure consistent with RUL volume loss.    - Treatment/interventions: 500cc NS, lopressor 5mg IV, solumedrol 125mg, duoneb x1 77 y/o Male with PMHx of Afib (Eliquis), HLD, COPD (home 2LNC), renal CA (s/p partial rt nephrectomy "within last year," in remission) and HTN presenting d/t feeling weak for the past week. Patient states that he's been having on and off episodes of feeling weak since last Saturday 8/5. He was doing errands when the weakness began. States he has been eating and drinking water and tea regularly. States that he has samples of Trelegy and used a sample on Thursday evening after which he felt a build up of phlegm in his throat. Has had increased SOB on exertion and has used his home 02 more often. Also has had a cough with yellow sputum since then. On Friday he ate breakfast and drank tea before going to [employer] around 11 AM where he felt sick and had to sit down. Upon standing up he states he felt dizzy and while looking at the park "saw blurry images." The dizziness persisted for a half an hour after which he went to the hospital for further workup. Was wearing his 02 at the time. Denies fevers/chill, n/v, orthopnea, chest pain, abdomina pain and diarrhea. States that his symptoms have improved upon coming to the hospital.     In the ED:    - VS: Afebrile, HR: 90, BP: 113/70, RR: 18, O2: 94% on 2LNC     - Pertinent Labs: wbc 8.2, K 5.4 ->4.7, BUN 46, Cr 2.15, AST 58, ALT 49, trop I neg, pro-BNP 2513. UA = large blood, >10rbc, 100 protein, RSV +    - Imaging:  ·	CT head:   ·	No acute intracranial hemorrhage, mass effect or demarcated territorial infarction  ·	CXR:   ·	Hyperaeration with emphysematous changes involving the upper lung zones (R>L)  ·	Pleural thickening and retraction of the right minor fissure consistent with RUL volume loss.    - Treatment/interventions: 500cc NS, lopressor 5mg IV, solumedrol 125mg, duoneb x1 79 y/o Male with PMHx of Afib (Eliquis), HLD, COPD (home 2LNC), renal CA (s/p partial rt nephrectomy "within last year," in remission) and HTN presenting d/t feeling weak for the past week. Patient states that he's been having on and off episodes of feeling weak since last Saturday 8/5. He was doing errands when the weakness began. States he has been eating and drinking water and tea regularly. States that he has samples of Trelegy and used a sample on Thursday evening after which he felt a build up of phlegm in his throat. Has had increased SOB on exertion (stops to rest after walking 1 city block) and has used his home O2 more often. Also has had a cough with yellow sputum since then. On Friday he ate breakfast and drank tea before going to [employer] around 11 AM where he felt sick and had to sit down. Upon standing up he states he felt dizzy and while looking at the park "saw blurry images." The dizziness persisted for a half an hour after which he went to the hospital for further workup. Was wearing his 02 at the time. Denies fevers/chill, n/v, orthopnea, chest pain, abdomina pain and diarrhea. States that his symptoms have improved upon coming to the hospital.     In the ED:    - VS: Afebrile, HR: 90, BP: 113/70, RR: 18, O2: 94% on 2LNC     - Pertinent Labs: wbc 8.2, K 5.4 ->4.7, BUN 46, Cr 2.15, AST 58, ALT 49, trop I neg, pro-BNP 2513. UA = large blood, >10rbc, 100 protein, RSV +    - Imaging:  ·	CT head:   ·	No acute intracranial hemorrhage, mass effect or demarcated territorial infarction  ·	CXR:   ·	Hyperaeration with emphysematous changes involving the upper lung zones (R>L)  ·	Pleural thickening and retraction of the right minor fissure consistent with RUL volume loss.    - Treatment/interventions: 500cc NS, lopressor 5mg IV, solumedrol 125mg, duoneb x1

## 2023-08-12 NOTE — H&P ADULT - NSICDXPASTMEDICALHX_GEN_ALL_CORE_FT
PAST MEDICAL HISTORY:  Afib     COPD, moderate on home O2    HTN (hypertension)     Pre-diabetes     Renal cancer     Supplemental oxygen dependent

## 2023-08-12 NOTE — H&P ADULT - PROBLEM SELECTOR PLAN 5
- atorvastatin 80mg daily - holding losartan 50mg daily ISO BONNY Previously on Losartan, however patient says this was discontinued. Follow up with Cardiologist Dr. Olegario Preston.  - c/w home lopressor PO Former smoker. On home 2LNC, no evidence of hypoxia  - continue home 2LNC (goal O2 is 88-92%)  - duonebs PRN

## 2023-08-12 NOTE — H&P ADULT - PROBLEM SELECTOR PLAN 1
- ramiro PRN  - tessalon - ramiro PRN  - tessalon  - no indication for steroids at this time Pt complaining of fatigue for the past week and dyspnea on exertion for the past 3 days. Also with nonproductive cough. RSV+.  - duonebs PRN  - tessalon  - no indication for steroids at this time Pt complaining of fatigue for the past week and dyspnea on exertion for the past 3 days. Also with nonproductive cough. RSV+.  - supportive care  - duonebs PRN  - tessalon perles for cough  - no indication for steroids at this time

## 2023-08-12 NOTE — H&P ADULT - PROBLEM SELECTOR PLAN 10
F: 1000 mL NS 80 cc/hr 24 hr  E: Replete K<4, Mg<2  N: DASH diet  VTE Ppx: Eliquis 2.5mg BID  GI: not needed  C: Full Code    Problem: Nutrition, metabolism, and development symptoms

## 2023-08-12 NOTE — H&P ADULT - PROBLEM SELECTOR PLAN 8
F: Tolerating PO, no IVF  E: Replete K<4, Mg<2  N: DASH diet  VTE Ppx: Eliquis 2.5mg BID  GI: not needed  C: Full Code    Problem: Nutrition, metabolism, and development symptoms F: 1000 mL NS 80 cc/hr 24 hr  E: Replete K<4, Mg<2  N: DASH diet  VTE Ppx: Eliquis 2.5mg BID  GI: not needed  C: Full Code    Problem: Nutrition, metabolism, and development symptoms History of one week of weakness and fatigue. Pt appears malnourished. States he completes ADLs w/o assistance. Uses a shopping cart to help steady his walking when doing errands.   - Encourage PO intake   - IVF  - PT consult - continue home atorvastatin 80mg daily

## 2023-08-12 NOTE — H&P ADULT - PROBLEM SELECTOR PLAN 7
F: Tolerating PO, no IVF  E: Replete K<4, Mg<2  N: DASH diet  VTE Ppx: Eliquis 2.5mg BID  GI: not needed  C: Full Code    Problem: Nutrition, metabolism, and development symptoms - tamsulosin 0.4mg daily Previously on Losartan, however patient says this was discontinued. Follow up with Cardiologist Dr. Olegario Preston.  - c/w home lopressor PO  - collateral from PCP if continues to be hypertensive

## 2023-08-12 NOTE — H&P ADULT - PROBLEM SELECTOR PLAN 9
UA >10 RBC and large blood in urine likely in the setting of previous renal cancer. Pt is not anemic so there is no indication to hold AC at this time.   - Trend CBC   - Continue w/ home Eliquis 2.5 mg BID - continue home tamsulosin 0.4mg daily  - bladder scan to r/o obstruction

## 2023-08-12 NOTE — H&P ADULT - NS ATTEST RISK PROBLEM GEN_ALL_CORE FT
chronic hypoxic respiratory failure  BONNY in the setting of hypovolemia  need for oxygen supplementaion

## 2023-08-12 NOTE — H&P ADULT - NSHPSOCIALHISTORY_GEN_ALL_CORE
Tobacco  Alcohol  Drug    Living situation Tobacco: former smoker (age 14-23)  Alcohol: denies use  Drug: denies use    Living situation: , lives alone, does not need assistance with ADLs Tobacco: former smoker (age 14-23)  Alcohol: denies use  Drug: denies use    Living situation: , lives alone, has one son, does not need assistance with ADLs  Uses a shopping cart to help ambulate

## 2023-08-12 NOTE — PATIENT PROFILE ADULT - FALL HARM RISK - HARM RISK INTERVENTIONS
Assistance with ambulation/Assistance OOB with selected safe patient handling equipment/Communicate Risk of Fall with Harm to all staff/Monitor gait and stability/Reinforce activity limits and safety measures with patient and family/Sit up slowly, dangle for a short time, stand at bedside before walking/Tailored Fall Risk Interventions/Visual Cue: Yellow wristband and red socks/Bed in lowest position, wheels locked, appropriate side rails in place/Call bell, personal items and telephone in reach/Instruct patient to call for assistance before getting out of bed or chair/Non-slip footwear when patient is out of bed/[place] to call system/Physically safe environment - no spills, clutter or unnecessary equipment/Purposeful Proactive Rounding/Room/bathroom lighting operational, light cord in reach

## 2023-08-13 LAB
ALBUMIN SERPL ELPH-MCNC: 3.6 G/DL — SIGNIFICANT CHANGE UP (ref 3.3–5)
ALP SERPL-CCNC: 115 U/L — SIGNIFICANT CHANGE UP (ref 40–120)
ALT FLD-CCNC: 107 U/L — HIGH (ref 10–45)
ANION GAP SERPL CALC-SCNC: 14 MMOL/L — SIGNIFICANT CHANGE UP (ref 5–17)
AST SERPL-CCNC: 118 U/L — HIGH (ref 10–40)
BASOPHILS # BLD AUTO: 0.02 K/UL — SIGNIFICANT CHANGE UP (ref 0–0.2)
BASOPHILS NFR BLD AUTO: 0.1 % — SIGNIFICANT CHANGE UP (ref 0–2)
BILIRUB SERPL-MCNC: 0.4 MG/DL — SIGNIFICANT CHANGE UP (ref 0.2–1.2)
BUN SERPL-MCNC: 45 MG/DL — HIGH (ref 7–23)
CALCIUM SERPL-MCNC: 8.7 MG/DL — SIGNIFICANT CHANGE UP (ref 8.4–10.5)
CHLORIDE SERPL-SCNC: 104 MMOL/L — SIGNIFICANT CHANGE UP (ref 96–108)
CO2 SERPL-SCNC: 19 MMOL/L — LOW (ref 22–31)
CREAT SERPL-MCNC: 1.31 MG/DL — HIGH (ref 0.5–1.3)
EGFR: 56 ML/MIN/1.73M2 — LOW
EOSINOPHIL # BLD AUTO: 0 K/UL — SIGNIFICANT CHANGE UP (ref 0–0.5)
EOSINOPHIL NFR BLD AUTO: 0 % — SIGNIFICANT CHANGE UP (ref 0–6)
GLUCOSE SERPL-MCNC: 156 MG/DL — HIGH (ref 70–99)
HCT VFR BLD CALC: 41.3 % — SIGNIFICANT CHANGE UP (ref 39–50)
HCV AB S/CO SERPL IA: 0.04 S/CO — SIGNIFICANT CHANGE UP
HCV AB SERPL-IMP: SIGNIFICANT CHANGE UP
HGB BLD-MCNC: 13.7 G/DL — SIGNIFICANT CHANGE UP (ref 13–17)
IMM GRANULOCYTES NFR BLD AUTO: 0.5 % — SIGNIFICANT CHANGE UP (ref 0–0.9)
LYMPHOCYTES # BLD AUTO: 11.5 % — LOW (ref 13–44)
LYMPHOCYTES # BLD AUTO: 2.19 K/UL — SIGNIFICANT CHANGE UP (ref 1–3.3)
MAGNESIUM SERPL-MCNC: 1.9 MG/DL — SIGNIFICANT CHANGE UP (ref 1.6–2.6)
MCHC RBC-ENTMCNC: 32.3 PG — SIGNIFICANT CHANGE UP (ref 27–34)
MCHC RBC-ENTMCNC: 33.2 GM/DL — SIGNIFICANT CHANGE UP (ref 32–36)
MCV RBC AUTO: 97.4 FL — SIGNIFICANT CHANGE UP (ref 80–100)
MONOCYTES # BLD AUTO: 1.78 K/UL — HIGH (ref 0–0.9)
MONOCYTES NFR BLD AUTO: 9.4 % — SIGNIFICANT CHANGE UP (ref 2–14)
NEUTROPHILS # BLD AUTO: 14.9 K/UL — HIGH (ref 1.8–7.4)
NEUTROPHILS NFR BLD AUTO: 78.5 % — HIGH (ref 43–77)
NRBC # BLD: 0 /100 WBCS — SIGNIFICANT CHANGE UP (ref 0–0)
PHOSPHATE SERPL-MCNC: 3.3 MG/DL — SIGNIFICANT CHANGE UP (ref 2.5–4.5)
PLATELET # BLD AUTO: 193 K/UL — SIGNIFICANT CHANGE UP (ref 150–400)
POTASSIUM SERPL-MCNC: 4.6 MMOL/L — SIGNIFICANT CHANGE UP (ref 3.5–5.3)
POTASSIUM SERPL-SCNC: 4.6 MMOL/L — SIGNIFICANT CHANGE UP (ref 3.5–5.3)
PROT SERPL-MCNC: 6.2 G/DL — SIGNIFICANT CHANGE UP (ref 6–8.3)
RBC # BLD: 4.24 M/UL — SIGNIFICANT CHANGE UP (ref 4.2–5.8)
RBC # FLD: 13.2 % — SIGNIFICANT CHANGE UP (ref 10.3–14.5)
SODIUM SERPL-SCNC: 137 MMOL/L — SIGNIFICANT CHANGE UP (ref 135–145)
WBC # BLD: 18.99 K/UL — HIGH (ref 3.8–10.5)
WBC # FLD AUTO: 18.99 K/UL — HIGH (ref 3.8–10.5)

## 2023-08-13 PROCEDURE — 99233 SBSQ HOSP IP/OBS HIGH 50: CPT | Mod: GC

## 2023-08-13 RX ORDER — GUAIFENESIN/DEXTROMETHORPHAN 600MG-30MG
10 TABLET, EXTENDED RELEASE 12 HR ORAL EVERY 4 HOURS
Refills: 0 | Status: DISCONTINUED | OUTPATIENT
Start: 2023-08-13 | End: 2023-08-16

## 2023-08-13 RX ADMIN — Medication 3 MILLILITER(S): at 19:39

## 2023-08-13 RX ADMIN — ATORVASTATIN CALCIUM 80 MILLIGRAM(S): 80 TABLET, FILM COATED ORAL at 22:36

## 2023-08-13 RX ADMIN — Medication 3 MILLILITER(S): at 01:55

## 2023-08-13 RX ADMIN — APIXABAN 2.5 MILLIGRAM(S): 2.5 TABLET, FILM COATED ORAL at 22:36

## 2023-08-13 RX ADMIN — Medication 100 MILLIGRAM(S): at 22:36

## 2023-08-13 RX ADMIN — Medication 25 MILLIGRAM(S): at 10:32

## 2023-08-13 RX ADMIN — Medication 10 MILLILITER(S): at 22:36

## 2023-08-13 RX ADMIN — TAMSULOSIN HYDROCHLORIDE 0.4 MILLIGRAM(S): 0.4 CAPSULE ORAL at 22:36

## 2023-08-13 RX ADMIN — Medication 3 MILLILITER(S): at 15:06

## 2023-08-13 RX ADMIN — Medication 25 MILLIGRAM(S): at 22:36

## 2023-08-13 RX ADMIN — APIXABAN 2.5 MILLIGRAM(S): 2.5 TABLET, FILM COATED ORAL at 10:32

## 2023-08-13 RX ADMIN — Medication 10 MILLILITER(S): at 18:31

## 2023-08-13 RX ADMIN — Medication 10 MILLILITER(S): at 12:41

## 2023-08-13 RX ADMIN — Medication 100 MILLIGRAM(S): at 05:45

## 2023-08-13 RX ADMIN — Medication 100 MILLIGRAM(S): at 12:41

## 2023-08-13 RX ADMIN — Medication 3 MILLILITER(S): at 05:44

## 2023-08-13 NOTE — DISCHARGE NOTE PROVIDER - CARE PROVIDER_API CALL
FAITH TORREZ  [address] [place] [address]  Phone: [phone]  Fax: [phone]  Established Patient  Follow Up Time:    Harjit Velarde  [address] [place] [address], [address] [place] [address]  Phone: [phone]  Fax: (   )    -  Established Patient  Scheduled Appointment: 09/15/2023 09:45 AM

## 2023-08-13 NOTE — PROGRESS NOTE ADULT - SUBJECTIVE AND OBJECTIVE BOX
SUBJECTIVE/OVERNIGHT EVENTS: No acute overnight events.     Pt seen in AM at bedside, coughing  Patien tis clinically improved but does not feel safe to go home today. Agreed to reevaluate status tomorrow.    When asked, pt denies any recent or active fever, chills, nausea, vomiting, headache, chest pain, abdominal pain, genitourinary sx, extremity pain or swelling.    VITAL SIGNS:  Vital Signs Last 24 Hrs  T(C): 36.4 (13 Aug 2023 11:52), Max: 36.4 (13 Aug 2023 11:08)  T(F): 97.6 (13 Aug 2023 11:52), Max: 97.6 (13 Aug 2023 11:08)  HR: 76 (13 Aug 2023 11:52) (70 - 94)  BP: 130/72 (13 Aug 2023 11:52) (115/65 - 130/72)  BP(mean): --  RR: 18 (13 Aug 2023 11:52) (18 - 19)  SpO2: 95% (13 Aug 2023 11:52) (94% - 96%)    Parameters below as of 13 Aug 2023 11:52  Patient On (Oxygen Delivery Method): nasal cannula  O2 Flow (L/min): 2      PHYSICAL EXAM:  Eyes: EOMI intact bilaterally; Anicteric sclerae  HENT: Dry mucous membranes  Neck: Trachea midline, supple  Lungs: Pt coughing, Diminished lung sounds, CTA B/L. No wheezes, rales, or rhonchi  Cardiovascular: RRR. No murmurs, rubs, or gallops  Abdomen: Soft, non-tender non-distended; No rebound or guarding  Extremities: no calf tenderness b/l; No clubbing, cyanosis or edema  MSK: no CVA tenderness bilaterally  Neurological: Alert and oriented x3  Skin: Warm and dry. No obvious rashdeficits    MEDICATIONS:  MEDICATIONS  (STANDING):  apixaban 2.5 milliGRAM(s) Oral every 12 hours  atorvastatin 80 milliGRAM(s) Oral at bedtime  benzonatate 100 milliGRAM(s) Oral three times a day  guaifenesin/dextromethorphan Oral Liquid 10 milliLiter(s) Oral every 4 hours  metoprolol tartrate 25 milliGRAM(s) Oral every 12 hours  sodium chloride 0.9%. 1000 milliLiter(s) (80 mL/Hr) IV Continuous <Continuous>  tamsulosin 0.4 milliGRAM(s) Oral at bedtime    MEDICATIONS  (PRN):  albuterol/ipratropium for Nebulization 3 milliLiter(s) Nebulizer every 6 hours PRN Shortness of Breath and/or Wheezing      ALLERGIES:  Allergies    penicillin (Rash)    Intolerances    potassium chloride (Other)      LABS:                        13.7   18.99 )-----------( 193      ( 13 Aug 2023 05:30 )             41.3     08-13    137  |  104  |  45<H>  ----------------------------<  156<H>  4.6   |  19<L>  |  1.31<H>    Ca    8.7      13 Aug 2023 05:30  Phos  3.3     08-13  Mg     1.9     08-13    TPro  6.2  /  Alb  3.6  /  TBili  0.4  /  DBili  x   /  AST  118<H>  /  ALT  107<H>  /  AlkPhos  115  08-13        RADIOLOGY & ADDITIONAL TESTS: Reviewed.

## 2023-08-13 NOTE — DISCHARGE NOTE PROVIDER - YES NO FOR MLM POSITIVE OR NEGATIVE COVID RESULT
, Klisyri Pregnancy And Lactation Text: It is unknown if this medication can harm a developing fetus or if it is excreted in breast milk.

## 2023-08-13 NOTE — DISCHARGE NOTE PROVIDER - HOSPITAL COURSE
Hospital course by problem  VERITO GOMEZ is a 77 y/o Male with PMHx of Afib(on Eliquis and metoprolol), HLD, COPD (on 2L nasal canula), renal cancer s/p partial rt nephrectomy and HTN presenting today c/o feeling weak for the past week admitted for further workup for weakness and management of RSV.       #Respiratory syncytial virus (RSV).   Pt complaining of fatigue for the past week and dyspnea on exertion for the past 3 days. Also with nonproductive cough. CXR without acute changes. RSV+. s/p solumedrol 125mg x1 in ED Patient received supportive care, duonebs PRN, gaifenisin DM for cough.    #BONNY (acute kidney injury).   BUN/Cr 46, 2.15 on admission. History of renal cancer and rt partial nephrectomy. Downtrending (2.15 --> 1.81) s/p 500cc NS in ED. suspect pre-renal in nature. no known kidney disease, however, UA showing protein and RBC/blood. BLadder scan without evidence fo retension. Cr. corrected to acceptable range.  - Follow up with pcp     #Hematuria.   UA >10 RBC and large blood in urine likely in the setting of previous renal cancer. Pt is not anemic so there is no indication to hold AC at this time.   - Follow up with PCP    #COPD (chronic obstructive pulmonary disease).   Former smoker. On home 2LNC, no evidence of hypoxia. Duonebs prn while in hospital.  - Continue home 2LNC (goal O2 is 88-92%)  - Follow up with PCP      #Atrial fibrillation.   Known PMH AFib, rate controlled and AC.  - continue home Eliquis 2.5mg twice daily  - continue home metoprolol tartrate 25mg twice daily.  - Follow up with Cardiologist Dr. Olegario Preston.    #Hypertension.   Previously on Losartan, however patient says this was discontinued. Follow up with Cardiologist Dr. Olegario Preston.  - continue home metoprolol tartrate 25mg twice daily.  - Follow up with Cardiologist Dr. Olegario Preston.    #Hyperlipidemia.   - Continue home atorvastatin 80mg daily.    #BPH (benign prostatic hyperplasia).   - Continue home tamsulosin 0.4mg daily      Patient was discharged to: home with home PT    New medications: None  Changes to old medications: None  Medications that were stopped: None  New Hardware: None  New DME: None    Items to follow up as outpatient:  COPD    Discharge physical exam:  General: in no acute distress  Eyes: EOMI intact bilaterally; Anicteric sclerae  HENT: Dry mucous membranes  Neck: Trachea midline, supple  Lungs: Diminished lung sounds, CTA B/L. No wheezes, rales, or rhonchi  Cardiovascular: RRR. No murmurs, rubs, or gallops  Abdomen: Soft, non-tender non-distended; No rebound or guarding  Extremities: no calf tenderness b/l; No clubbing, cyanosis or edema  MSK: no CVA tenderness bilaterally  Neurological: Alert and oriented x3  Skin: Warm and dry. No obvious Hospital course by problem  VERITO GOMEZ is a 79 y/o Male with PMHx of Afib(on Eliquis and metoprolol), HLD, COPD (on 2L nasal canula), renal cancer s/p partial rt nephrectomy and HTN presenting today c/o feeling weak for the past week admitted for further workup for weakness and management of RSV.     #Respiratory syncytial virus (RSV).   Pt complaining of fatigue for the past week and dyspnea on exertion for the past 3 days. Also with nonproductive cough. CXR without acute changes. RSV+. s/p solumedrol 125mg x1 in ED. Patient received supportive care, duonebs PRN, robitussin and tessalon for cough.  -continue symptomatic management   -at baseline O2 requirements     #BONNY (acute kidney injury).   BUN/Cr 46, 2.15 on admission. History of renal cancer and rt partial nephrectomy. Downtrending (2.15 --> 1.81) s/p 500cc NS in ED. suspect pre-renal in nature. no known kidney disease, however, UA showing protein and RBC/blood. Bladder scan without evidence fo retension. Cr corrected to acceptable range.  - Follow up with PCP     #Hematuria.   UA >10 RBC and large blood in urine likely in the setting of previous renal cancer. Pt is not anemic so there is no indication to hold AC at this time.   - Follow up with PCP    #COPD (chronic obstructive pulmonary disease).   Former smoker. On home 2LNC, no evidence of hypoxia. Duonebs prn while in hospital.  - Continue home 2LNC (goal O2 is 88-92%)  - Follow up with PCP    #Atrial fibrillation.   Known PMH AFib, rate controlled and AC.  - continue home Eliquis 2.5mg twice daily  - continue home metoprolol tartrate 25mg twice daily.  - Follow up with Cardiologist Dr. Olegario Preston.    #Hypertension.   Previously on Losartan, however patient says this was discontinued. Follow up with Cardiologist Dr. Olegario Preston.  - continue home metoprolol tartrate 25mg twice daily.  - Follow up with Cardiologist Dr. Olegario Preston.    #Hyperlipidemia.   - Continue home atorvastatin 80mg daily.    #BPH (benign prostatic hyperplasia).   - Continue home tamsulosin 0.4mg daily      Patient was discharged to: home with home PT    New medications: None  Changes to old medications: None  Medications that were stopped: None  New Hardware: None  New DME: None    Items to follow up as outpatient:  COPD    Discharge physical exam:  General: in no acute distress  Eyes: EOMI intact bilaterally; Anicteric sclerae  HENT: Dry mucous membranes  Neck: Trachea midline, supple  Lungs: Diminished lung sounds, CTA B/L. No wheezes, rales, or rhonchi  Cardiovascular: RRR. No murmurs, rubs, or gallops  Abdomen: Soft, non-tender non-distended; No rebound or guarding  Extremities: no calf tenderness b/l; No clubbing, cyanosis or edema  MSK: no CVA tenderness bilaterally  Neurological: Alert and oriented x3  Skin: Warm and dry. No obvious Hospital course by problem  VERITO GOMEZ is a 77 y/o Male with PMHx of Afib(on Eliquis and metoprolol), HLD, COPD (on 2L nasal canula), renal cancer s/p partial rt nephrectomy and HTN presenting today c/o feeling weak for the past week admitted for further workup for weakness and management of RSV.     #Respiratory syncytial virus (RSV).   Pt complaining of fatigue for the past week and dyspnea on exertion for the past 3 days. Also with nonproductive cough. CXR without acute changes. RSV+. s/p solumedrol 125mg x1 in ED. Patient received supportive care, duonebs PRN, robitussin and tessalon for cough.  -continue symptomatic management   -at baseline O2 requirements     #BONNY (acute kidney injury).   BUN/Cr 46, 2.15 on admission. History of renal cancer and rt partial nephrectomy. Downtrending (2.15 --> 1.81) s/p 500cc NS in ED. suspect pre-renal in nature. no known kidney disease, however, UA showing protein and RBC/blood. Bladder scan without evidence fo retension. Cr corrected to acceptable range.  - Follow up with PCP     #Hematuria.   UA >10 RBC and large blood in urine likely in the setting of previous renal cancer. Pt is not anemic so there is no indication to hold AC at this time.   - Follow up with PCP    #COPD (chronic obstructive pulmonary disease).   Former smoker. On home 2LNC, no evidence of hypoxia. Duonebs prn while in hospital.  - Continue home 2LNC (goal O2 is 88-92%)  - Follow up with PCP    #Atrial fibrillation.   Known PMH AFib, rate controlled and AC.  - continue home Eliquis 2.5mg twice daily  - continue home metoprolol tartrate 25mg twice daily.  - Follow up with Cardiologist Dr. Olegario Preston.    #Hypertension.   Previously on Losartan, however patient says this was discontinued. Follow up with Cardiologist Dr. Olegario Preston.  - continue home metoprolol tartrate 25mg twice daily.  - Follow up with Cardiologist Dr. Olegario Preston.    #Hyperlipidemia.   - Continue home atorvastatin 80mg daily.    #BPH (benign prostatic hyperplasia).   - Continue home tamsulosin 0.4mg daily      Patient was discharged to: [facility]    New medications: None  Changes to old medications: None  Medications that were stopped: None  New Hardware: None  New DME: None    Items to follow up as outpatient:  COPD    Discharge physical exam:  General: in no acute distress  Eyes: EOMI intact bilaterally; Anicteric sclerae  HENT: Dry mucous membranes  Neck: Trachea midline, supple  Lungs: Diminished lung sounds, CTA B/L. No wheezes, rales, or rhonchi  Cardiovascular: RRR. No murmurs, rubs, or gallops  Abdomen: Soft, non-tender non-distended; No rebound or guarding  Extremities: no calf tenderness b/l; No clubbing, cyanosis or edema  MSK: no CVA tenderness bilaterally  Neurological: Alert and oriented x3  Skin: Warm and dry. No obvious Hospital course by problem  VERITO GOMEZ is a 77 y/o Male with PMHx of Afib(on Eliquis and metoprolol), HLD, COPD (on 2L nasal canula), renal cancer s/p partial rt nephrectomy and HTN presenting with c/o feeling weak for the past week admitted for further workup for weakness and management of RSV.     #Respiratory syncytial virus (RSV).   Pt complaining of fatigue for the past week and dyspnea on exertion. Also with nonproductive cough. CXR without acute changes. RSV+. s/p solumedrol 125mg x1 in ED. Patient received supportive care, duonebs PRN, robitussin and tessalon for cough.  -continue symptomatic management   -at baseline O2 requirements     #BONNY (acute kidney injury).   BUN/Cr 46, 2.15 on admission. History of renal cancer and rt partial nephrectomy. Downtrending while inpatient. suspect pre-renal in nature. no known kidney disease, however, UA showing protein and RBC/blood. Bladder scan without evidence of retention. Cr corrected to acceptable range.  - Follow up with PCP     #Hematuria.   UA >10 RBC and large blood in urine likely in the setting of previous renal cancer. Pt is not anemic so there is no indication to hold AC at this time.   - Follow up with PCP    #COPD (chronic obstructive pulmonary disease).   Former smoker. On home 2LNC, no evidence of hypoxia. Duonebs prn while in hospital.  - Continue home 2LNC (goal O2 is 88-92%)  - Follow up with PCP    #Atrial fibrillation.   Known PMH AFib, rate controlled and AC.  - continue home Eliquis 2.5mg twice daily  - continue home metoprolol tartrate 25mg twice daily.  - Follow up with Cardiologist Dr. Olegario Preston.    #Hypertension.   Previously on Losartan, however patient says this was discontinued. Follow up with Cardiologist Dr. Olegario Preston.  - continue home metoprolol tartrate 25mg twice daily.  - Follow up with Cardiologist Dr. Olegario Preston.    #Hyperlipidemia.   - Continue home atorvastatin 80mg daily.    #BPH (benign prostatic hyperplasia).   - Continue home tamsulosin 0.4mg daily      Patient was discharged to: [facility]    New medications: None  Changes to old medications: None  Medications that were stopped: None  New Hardware: None  New DME: None    Items to follow up as outpatient:  COPD    Discharge physical exam:  General: in no acute distress  Eyes: EOMI intact bilaterally; Anicteric sclerae  HENT: Dry mucous membranes  Neck: Trachea midline, supple  Lungs: Diminished lung sounds, CTA B/L. No wheezes, rales, or rhonchi  Cardiovascular: RRR. No murmurs, rubs, or gallops  Abdomen: Soft, non-tender non-distended; No rebound or guarding  Extremities: no calf tenderness b/l; No clubbing, cyanosis or edema  MSK: no CVA tenderness bilaterally  Neurological: Alert and oriented x3  Skin: Warm and dry. No obvious

## 2023-08-13 NOTE — DISCHARGE NOTE PROVIDER - PROVIDER TOKENS
PROVIDER:[TOKEN:[[id number]],ESTABLISHEDPATIENT:[T]] FREE:[LAST:[Horton],FIRST:[Harjit],PHONE:[phone]],FAX:[(   )    -],ADDRESS:[[address] [place] [address]],SCHEDULEDAPPT:[09/15/2023],SCHEDULEDAPPTTIME:[09:45 AM],ESTABLISHEDPATIENT:[T]]

## 2023-08-13 NOTE — DISCHARGE NOTE PROVIDER - NSDCMRMEDTOKEN_GEN_ALL_CORE_FT
atorvastatin 80 mg oral tablet: 1 orally once a day (at bedtime)  Eliquis 2.5 mg oral tablet: 1 orally 2 times a day  metoprolol tartrate 25 mg oral tablet: 1 orally 2 times a day  tamsulosin 0.4 mg oral capsule: 1 orally once a day (at bedtime)

## 2023-08-13 NOTE — DISCHARGE NOTE PROVIDER - NSDCCPCAREPLAN_GEN_ALL_CORE_FT
PRINCIPAL DISCHARGE DIAGNOSIS  Diagnosis: RSV infection  Assessment and Plan of Treatment: Respiratory syncytial virus (RSV) causes infections of the lungs and respiratory tract. It's so common that most children have been infected with the virus by age 2. Respiratory syncytial (sin-SISH-ul) virus can also infect adults.  In adults and older, healthy children, respiratory syncytial virus (RSV) symptoms are mild and typically mimic the common cold. Self-care measures are usually all that's needed to relieve any discomfort.  RSV can cause severe infection in some people, including babies 12 months and younger (infants), especially premature infants, older adults, people with heart and lung disease, or anyone with a weak immune system (immunocompromised).     PRINCIPAL DISCHARGE DIAGNOSIS  Diagnosis: RSV infection  Assessment and Plan of Treatment: Respiratory syncytial virus (RSV) causes infections of the lungs and respiratory tract. It's so common that most children have been infected with the virus by age 2. Respiratory syncytial (sin-SISH-ul) virus can also infect adults.  In adults and older, healthy children, respiratory syncytial virus (RSV) symptoms are mild and typically mimic the common cold. Self-care measures are usually all that's needed to relieve any discomfort.  RSV can cause severe infection in some people, including babies 12 months and younger (infants), especially premature infants, older adults, people with heart and lung disease, or anyone with a weak immune system (immunocompromised).   Please continue to take tylenol every 6 hours as needed for pain and robitussin every 6 hours as needed for cough. You can get these medications over the counter. In addition, make sure you stay hydrated and follow up with your primary care doctor and cardiologist.

## 2023-08-13 NOTE — PHYSICAL THERAPY INITIAL EVALUATION ADULT - PERTINENT HX OF CURRENT PROBLEM, REHAB EVAL
78M presenting d/t feeling weak for the past week. Patient states that he's been having on and off episodes of feeling weak since last Saturday 8/5. He was doing errands when the weakness began. States he has been eating and drinking water and tea regularly. States that he has samples of Trelegy and used a sample on Thursday evening after which he felt a build up of phlegm in his throat. Has had increased SOB on exertion (stops to rest after walking 1 city block) and has used his home O2 more often. Also has had a cough with yellow sputum since then. On Friday he ate breakfast and drank tea before going to [employer] around 11 AM where he felt sick and had to sit down.

## 2023-08-14 DIAGNOSIS — R74.01 ELEVATION OF LEVELS OF LIVER TRANSAMINASE LEVELS: ICD-10-CM

## 2023-08-14 LAB
ALBUMIN SERPL ELPH-MCNC: 3.5 G/DL — SIGNIFICANT CHANGE UP (ref 3.3–5)
ALP SERPL-CCNC: 103 U/L — SIGNIFICANT CHANGE UP (ref 40–120)
ALT FLD-CCNC: 108 U/L — HIGH (ref 10–45)
ANION GAP SERPL CALC-SCNC: 10 MMOL/L — SIGNIFICANT CHANGE UP (ref 5–17)
AST SERPL-CCNC: 70 U/L — HIGH (ref 10–40)
BASOPHILS # BLD AUTO: 0.02 K/UL — SIGNIFICANT CHANGE UP (ref 0–0.2)
BASOPHILS NFR BLD AUTO: 0.2 % — SIGNIFICANT CHANGE UP (ref 0–2)
BILIRUB SERPL-MCNC: 0.7 MG/DL — SIGNIFICANT CHANGE UP (ref 0.2–1.2)
BUN SERPL-MCNC: 35 MG/DL — HIGH (ref 7–23)
CALCIUM SERPL-MCNC: 8.9 MG/DL — SIGNIFICANT CHANGE UP (ref 8.4–10.5)
CHLORIDE SERPL-SCNC: 105 MMOL/L — SIGNIFICANT CHANGE UP (ref 96–108)
CO2 SERPL-SCNC: 22 MMOL/L — SIGNIFICANT CHANGE UP (ref 22–31)
CREAT SERPL-MCNC: 1.17 MG/DL — SIGNIFICANT CHANGE UP (ref 0.5–1.3)
CULTURE RESULTS: SIGNIFICANT CHANGE UP
EGFR: 64 ML/MIN/1.73M2 — SIGNIFICANT CHANGE UP
EOSINOPHIL # BLD AUTO: 0.06 K/UL — SIGNIFICANT CHANGE UP (ref 0–0.5)
EOSINOPHIL NFR BLD AUTO: 0.5 % — SIGNIFICANT CHANGE UP (ref 0–6)
GLUCOSE SERPL-MCNC: 177 MG/DL — HIGH (ref 70–99)
HCT VFR BLD CALC: 39 % — SIGNIFICANT CHANGE UP (ref 39–50)
HGB BLD-MCNC: 12.7 G/DL — LOW (ref 13–17)
IMM GRANULOCYTES NFR BLD AUTO: 0.3 % — SIGNIFICANT CHANGE UP (ref 0–0.9)
LYMPHOCYTES # BLD AUTO: 1.16 K/UL — SIGNIFICANT CHANGE UP (ref 1–3.3)
LYMPHOCYTES # BLD AUTO: 9.7 % — LOW (ref 13–44)
MAGNESIUM SERPL-MCNC: 1.7 MG/DL — SIGNIFICANT CHANGE UP (ref 1.6–2.6)
MCHC RBC-ENTMCNC: 32.1 PG — SIGNIFICANT CHANGE UP (ref 27–34)
MCHC RBC-ENTMCNC: 32.6 GM/DL — SIGNIFICANT CHANGE UP (ref 32–36)
MCV RBC AUTO: 98.5 FL — SIGNIFICANT CHANGE UP (ref 80–100)
MONOCYTES # BLD AUTO: 1.15 K/UL — HIGH (ref 0–0.9)
MONOCYTES NFR BLD AUTO: 9.6 % — SIGNIFICANT CHANGE UP (ref 2–14)
NEUTROPHILS # BLD AUTO: 9.59 K/UL — HIGH (ref 1.8–7.4)
NEUTROPHILS NFR BLD AUTO: 79.7 % — HIGH (ref 43–77)
NRBC # BLD: 0 /100 WBCS — SIGNIFICANT CHANGE UP (ref 0–0)
PHOSPHATE SERPL-MCNC: 2.2 MG/DL — LOW (ref 2.5–4.5)
PLATELET # BLD AUTO: 174 K/UL — SIGNIFICANT CHANGE UP (ref 150–400)
POTASSIUM SERPL-MCNC: 4.3 MMOL/L — SIGNIFICANT CHANGE UP (ref 3.5–5.3)
POTASSIUM SERPL-SCNC: 4.3 MMOL/L — SIGNIFICANT CHANGE UP (ref 3.5–5.3)
PROT SERPL-MCNC: 6.5 G/DL — SIGNIFICANT CHANGE UP (ref 6–8.3)
RBC # BLD: 3.96 M/UL — LOW (ref 4.2–5.8)
RBC # FLD: 13.3 % — SIGNIFICANT CHANGE UP (ref 10.3–14.5)
SODIUM SERPL-SCNC: 137 MMOL/L — SIGNIFICANT CHANGE UP (ref 135–145)
SPECIMEN SOURCE: SIGNIFICANT CHANGE UP
WBC # BLD: 12.02 K/UL — HIGH (ref 3.8–10.5)
WBC # FLD AUTO: 12.02 K/UL — HIGH (ref 3.8–10.5)

## 2023-08-14 PROCEDURE — 99232 SBSQ HOSP IP/OBS MODERATE 35: CPT | Mod: GC

## 2023-08-14 RX ORDER — SODIUM,POTASSIUM PHOSPHATES 278-250MG
1 POWDER IN PACKET (EA) ORAL ONCE
Refills: 0 | Status: COMPLETED | OUTPATIENT
Start: 2023-08-14 | End: 2023-08-14

## 2023-08-14 RX ADMIN — Medication 10 MILLILITER(S): at 02:17

## 2023-08-14 RX ADMIN — TAMSULOSIN HYDROCHLORIDE 0.4 MILLIGRAM(S): 0.4 CAPSULE ORAL at 21:45

## 2023-08-14 RX ADMIN — Medication 25 MILLIGRAM(S): at 21:45

## 2023-08-14 RX ADMIN — Medication 25 MILLIGRAM(S): at 10:00

## 2023-08-14 RX ADMIN — Medication 3 MILLILITER(S): at 06:17

## 2023-08-14 RX ADMIN — APIXABAN 2.5 MILLIGRAM(S): 2.5 TABLET, FILM COATED ORAL at 10:00

## 2023-08-14 RX ADMIN — Medication 10 MILLILITER(S): at 21:45

## 2023-08-14 RX ADMIN — Medication 10 MILLILITER(S): at 10:00

## 2023-08-14 RX ADMIN — APIXABAN 2.5 MILLIGRAM(S): 2.5 TABLET, FILM COATED ORAL at 21:45

## 2023-08-14 RX ADMIN — Medication 10 MILLILITER(S): at 06:16

## 2023-08-14 RX ADMIN — Medication 100 MILLIGRAM(S): at 06:16

## 2023-08-14 RX ADMIN — Medication 10 MILLILITER(S): at 14:07

## 2023-08-14 RX ADMIN — Medication 1 PACKET(S): at 12:49

## 2023-08-14 RX ADMIN — Medication 10 MILLILITER(S): at 18:13

## 2023-08-14 NOTE — CONSULT NOTE ADULT - SUBJECTIVE AND OBJECTIVE BOX
Patient is a 78y old  Male who presents with a chief complaint of     HPI:  77 y/o Male with PMHx of Afib (Eliquis), HLD, COPD (home 2LNC), renal CA (s/p partial rt nephrectomy "within last year," in remission) and HTN presenting d/t feeling weak for the past week. Patient states that he's been having on and off episodes of feeling weak since last Saturday 8/5. He was doing errands when the weakness began. States he has been eating and drinking water and tea regularly. States that he has samples of Trelegy and used a sample on Thursday evening after which he felt a build up of phlegm in his throat. Has had increased SOB on exertion (stops to rest after walking 1 city block) and has used his home O2 more often. Also has had a cough with yellow sputum since then. On Friday he ate breakfast and drank tea before going to [employer] around 11 AM where he felt sick and had to sit down. Upon standing up he states he felt dizzy and while looking at the park "saw blurry images." The dizziness persisted for a half an hour after which he went to the hospital for further workup. Was wearing his 02 at the time. Denies fevers/chill, n/v, orthopnea, chest pain, abdomina pain and diarrhea. States that his symptoms have improved upon coming to the hospital.     In the ED:    - VS: Afebrile, HR: 90, BP: 113/70, RR: 18, O2: 94% on 2LNC     - Pertinent Labs: wbc 8.2, K 5.4 ->4.7, BUN 46, Cr 2.15, AST 58, ALT 49, trop I neg, pro-BNP 2513. UA = large blood, >10rbc, 100 protein, RSV +    - Imaging:  ·	CT head:   ·	No acute intracranial hemorrhage, mass effect or demarcated territorial infarction  ·	CXR:   ·	Hyperaeration with emphysematous changes involving the upper lung zones (R>L)  ·	Pleural thickening and retraction of the right minor fissure consistent with RUL volume loss.    - Treatment/interventions: 500cc NS, lopressor 5mg IV, solumedrol 125mg, duoneb x1 (12 Aug 2023 07:08)    PAST MEDICAL & SURGICAL HISTORY:  Afib      HTN (hypertension)      Pre-diabetes      COPD, moderate  on home O2      Supplemental oxygen dependent      Renal cancer      H/O partial nephrectomy        MEDICATIONS  (STANDING):  apixaban 2.5 milliGRAM(s) Oral every 12 hours  guaifenesin/dextromethorphan Oral Liquid 10 milliLiter(s) Oral every 4 hours  metoprolol tartrate 25 milliGRAM(s) Oral every 12 hours  sodium chloride 0.9%. 1000 milliLiter(s) (80 mL/Hr) IV Continuous <Continuous>  tamsulosin 0.4 milliGRAM(s) Oral at bedtime    MEDICATIONS  (PRN):  albuterol/ipratropium for Nebulization 3 milliLiter(s) Nebulizer every 6 hours PRN Shortness of Breath and/or Wheezing        FAMILY HISTORY:  No pertinent family history in first degree relatives        CBC Full  -  ( 13 Aug 2023 05:30 )  WBC Count : 18.99 K/uL  RBC Count : 4.24 M/uL  Hemoglobin : 13.7 g/dL  Hematocrit : 41.3 %  Platelet Count - Automated : 193 K/uL  Mean Cell Volume : 97.4 fl  Mean Cell Hemoglobin : 32.3 pg  Mean Cell Hemoglobin Concentration : 33.2 gm/dL  Auto Neutrophil # : 14.90 K/uL  Auto Lymphocyte # : 2.19 K/uL  Auto Monocyte # : 1.78 K/uL  Auto Eosinophil # : 0.00 K/uL  Auto Basophil # : 0.02 K/uL  Auto Neutrophil % : 78.5 %  Auto Lymphocyte % : 11.5 %  Auto Monocyte % : 9.4 %  Auto Eosinophil % : 0.0 %  Auto Basophil % : 0.1 %      08-13    137  |  104  |  45<H>  ----------------------------<  156<H>  4.6   |  19<L>  |  1.31<H>    Ca    8.7      13 Aug 2023 05:30  Phos  3.3     08-13  Mg     1.9     08-13    TPro  6.2  /  Alb  3.6  /  TBili  0.4  /  DBili  x   /  AST  118<H>  /  ALT  107<H>  /  AlkPhos  115  08-13      Urinalysis Basic - ( 13 Aug 2023 05:30 )    Color: x / Appearance: x / SG: x / pH: x  Gluc: 156 mg/dL / Ketone: x  / Bili: x / Urobili: x   Blood: x / Protein: x / Nitrite: x   Leuk Esterase: x / RBC: x / WBC x   Sq Epi: x / Non Sq Epi: x / Bacteria: x        Radiology :     < from: CT Head No Cont (08.11.23 @ 15:46) >  ACC: [id number] EXAM:  CT BRAIN   ORDERED BY: MAT POOL     PROCEDURE DATE:  08/11/2023          INTERPRETATION:  Clinical history/reason for exam: Weakness    Technique: Multiple contiguous axial CT images of the head were obtained   from thebase of the skull to the vertex without the administration of IV   contrast. Coronal and sagittal reformatted images were obtained.    Comparison:None    Findings:    The ventricles and sulci are prominent consistent with parenchymal volume   loss.    No acute intracranial hemorrhage, extra-axial fluid collection, mass   effect or midline shift..    Gray-white matter differentiation is preserved. There are scattered foci   of periventricular and subcortical hypodensities consistent with mild   chronic microvascular ischemic disease. .    The bones of the calvarium are intact.    There is mucosal thickening of bilateral maxillary sinuses. The mastoid   air cells are well-aerated.    Impression:    No acute intracranial hemorrhage, mass effect or demarcated territorial   infarction. .    < from: Xray Chest 1 View AP/PA (08.11.23 @ 15:35) >  ACC: [id number] EXAM:  XR CHEST AP OR PA 1V   ORDERED BY: MAT POOL     PROCEDURE DATE:  08/11/2023          INTERPRETATION:  X-RAY CHEST    HISTORY: Chest pain.    COMPARISON: None.    TECHNIQUE: Portable AP view of the chest.    FINDINGS:    Lines/devices: None.    Lungs/pleura: There is edematous changes involving the upper lung zones,   more pronounced in the right upper lung zone. There is thickening of the   right minor fissure with superior retraction consistent with right upper   lobe volume loss. There is hyperaeration of the lung zones compatible   with underlying obstructive airway disease. No lobar consolidation or   pleural effusions. No pneumothorax. Cardiomediastinal and hilar contours   are within normal limits. No acute osseous pathology..    IMPRESSION:  Hyperaeration with emphysematous changes involving the upper lung zones   right greater than left.  Pleural thickening and retraction of the right minor fissure consistent   with right upper lobe volume loss.        Review of Systems : per HPI               Vital Signs Last 24 Hrs  T(C): 36.4 (14 Aug 2023 06:06), Max: 37.2 (13 Aug 2023 20:46)  T(F): 97.6 (14 Aug 2023 06:06), Max: 98.9 (13 Aug 2023 20:46)  HR: 97 (14 Aug 2023 06:06) (76 - 104)  BP: 106/70 (14 Aug 2023 06:06) (106/70 - 130/72)  BP(mean): --  RR: 18 (14 Aug 2023 06:06) (18 - 19)  SpO2: 94% (14 Aug 2023 06:06) (94% - 96%)    Parameters below as of 14 Aug 2023 06:06  Patient On (Oxygen Delivery Method): nasal cannula  O2 Flow (L/min): 2          Physical Exam :  onDROPLET &CONTACT RSV isolation , 78 y o man coughing , lying comfortably in semi Tolentino's position , awake , alert , no acute complaints , feels tired     Head : normocephalic , atraumatic    Eyes : PERRLA , EOMI , no nystagmus , sclera anicteric    ENT / FACE : neg nasal discharge , uvula midline , no oropharyngeal erythema / exudate    Neck : supple , negative JVD , negative carotid bruits , no thyromegaly    Chest : coarse bs     Cardiovascular : regular rate and rhythm , neg murmurs / rubs / gallops    Abdomen : soft , non distended , non tender to palpation in all 4 quadrants ,  normal bowel sounds     Extremities : WWP , neg cyanosis /clubbing / edema     Neurologic Exam :    Alert and oriented x 3     Motor Exam:          Right UE:               no focal weakness ,  > 4/5 throughout     Left UE:                 no focal weakness ,  > 4/5 throughout       Right LE:    no focal weakness ,  > 4/5 throughout       Left LE:    no focal weakness ,  > 4/5 throughout       Sensation :         intact to light touch x 4 extremities                                DTR :     biceps/brachioradialis : equal                      patella/ankle : equal      Gait :  not tested          PM&R Impression :     admitted for c/o  weakness and management of RSV     - deconditioned    - no focal weakness          Recommendations / Plan :           1) Physical / Occupational therapy focusing on therapeutic exercises , equipment evaluation , bed mobility/transfer out of bed evaluation , progressive ambulation with assistive devices prn .    2) Current disposition plan recommendation  :   d/c home with home physical therapy

## 2023-08-14 NOTE — CONSULT NOTE ADULT - ASSESSMENT
I M    78 y o Male with PMHx of Afib(on Eliquis and metoprolol), HLD, COPD (on 2L nasal canula), renal cancer s/p partial rt nephrectomy and HTN presenting today c/o feeling weak for the past week admitted for further workup for weakness and management of RSV. Patient is now medically ready for discharge.      Problem/Plan - 1:  ·  Problem: Respiratory syncytial virus (RSV).   ·  Plan: Pt complaining of fatigue for the past week and dyspnea on exertion for the past 3 days. Also with nonproductive cough. RSV+.  - supportive care  - duonebs PRN  - Robitussin DM q4 hr for cough  - no indication for steroids at this time.    Problem/Plan - 2:  ·  Problem: BONNY (acute kidney injury).   ·  Plan: BUN/Cr 46, 2.15 on admission. History of renal cancer and rt partial nephrectomy. Downtrending (2.15 --> 1.81) s/p 500cc NS in ED. Continued to downtrend on floors.  suspect pre-renal in nature. no known kidney disease, however, UA showing protein and RBC/blood.   - trend Cr  - encourage PO intake.    Problem/Plan - 3:  ·  Problem: Hematuria.   ·  Plan: UA >10 RBC and large blood in urine likely in the setting of previous renal cancer. Pt is not anemic so there is no indication to hold AC at this time.   - Trend CBC   - Continue w/ home Eliquis 2.5 mg BID.    Problem/Plan - 4:  ·  Problem: General weakness.   ·  Plan: History of one week of weakness and fatigue. Pt appears malnourished. States he completes ADLs w/o assistance. Uses a shopping cart to help steady his walking when doing errands. Pt recs home with home PT  - Encourage PO intake.    Problem/Plan - 5:  ·  Problem: COPD (chronic obstructive pulmonary disease).   ·  Plan: Former smoker. On home 2LNC, no evidence of hypoxia. Ambulatory O2 sat 93% on 2L NC.  - continue home 2LNC (goal O2 is 88-92%)  - duonebs PRN.    Problem/Plan - 6:  ·  Problem: Atrial fibrillation.   ·  Plan: Known PMH AFib, rate controlled.  - continue home Eliquis 2.5mg BID  - continue home metoprolol tartrate 25mg BID.    Problem/Plan - 7:  ·  Problem: Hypertension.   ·  Plan: Previously on Losartan, however patient says this was discontinued. Follow up with Cardiologist Dr. Olegario Preston.  - c/w home lopressor PO  - collateral from PCP if continues to be hypertensive.    Problem/Plan - 8:  ·  Problem: Hyperlipidemia.   ·  Plan: - continue home atorvastatin 80mg daily.    Problem/Plan - 9:  ·  Problem: BPH (benign prostatic hyperplasia).   ·  Plan: - continue home tamsulosin 0.4mg daily  - bladder scan to r/o obstruction.    Problem/Plan - 10:  ·  Problem: Need for prophylactic measure.   ·  Plan; F: 1000 mL NS 80 cc/hr 24 hr  E: Replete K<4, Mg<2  N: DASH diet  VTE Ppx: Eliquis 2.5mg BID  GI: not needed  C: Full Code    Problem: Nutrition, metabolism, and development symptoms.

## 2023-08-14 NOTE — PROGRESS NOTE ADULT - SUBJECTIVE AND OBJECTIVE BOX
OVERNIGHT EVENTS:  SUBJECTIVE: Patient was seen and examined at bedside.      VITAL SIGNS:  T(F): 97.6 (08-14-23 @ 06:06)  HR: 97 (08-14-23 @ 06:06)  BP: 106/70 (08-14-23 @ 06:06)  RR: 18 (08-14-23 @ 06:06)  SpO2: 94% (08-14-23 @ 06:06)  Wt(kg): --    PHYSICAL EXAM  GENERAL: NAD, lying in bed comfortably  HEAD:  Atraumatic, normocephalic  EYES: EOMI, PERRLA, conjunctiva and sclera clear  ENT: Moist mucous membranes  NECK: Supple, no JVD  HEART: Regular rate and rhythm, no murmurs, rubs, or gallops  LUNGS: Unlabored respirations.  Clear to auscultation bilaterally, no crackles, wheezing, or rhonchi  ABDOMEN: Soft, nontender, nondistended, +BS  EXTREMITIES: 2+ peripheral pulses bilaterally. No clubbing, cyanosis, or edema  NERVOUS SYSTEM:  A&Ox3, no focal deficits   SKIN: No rashes or lesions    MEDICATIONS  (STANDING):  apixaban 2.5 milliGRAM(s) Oral every 12 hours  atorvastatin 80 milliGRAM(s) Oral at bedtime  benzonatate 100 milliGRAM(s) Oral three times a day  guaifenesin/dextromethorphan Oral Liquid 10 milliLiter(s) Oral every 4 hours  metoprolol tartrate 25 milliGRAM(s) Oral every 12 hours  sodium chloride 0.9%. 1000 milliLiter(s) (80 mL/Hr) IV Continuous <Continuous>  tamsulosin 0.4 milliGRAM(s) Oral at bedtime    MEDICATIONS  (PRN):  albuterol/ipratropium for Nebulization 3 milliLiter(s) Nebulizer every 6 hours PRN Shortness of Breath and/or Wheezing      Allergies    penicillin (Rash)    Intolerances    potassium chloride (Other)      LABS:                        13.7   18.99 )-----------( 193      ( 13 Aug 2023 05:30 )             41.3     08-13    137  |  104  |  45<H>  ----------------------------<  156<H>  4.6   |  19<L>  |  1.31<H>    Ca    8.7      13 Aug 2023 05:30  Phos  3.3     08-13  Mg     1.9     08-13    TPro  6.2  /  Alb  3.6  /  TBili  0.4  /  DBili  x   /  AST  118<H>  /  ALT  107<H>  /  AlkPhos  115  08-13      Urinalysis Basic - ( 13 Aug 2023 05:30 )    Color: x / Appearance: x / SG: x / pH: x  Gluc: 156 mg/dL / Ketone: x  / Bili: x / Urobili: x   Blood: x / Protein: x / Nitrite: x   Leuk Esterase: x / RBC: x / WBC x   Sq Epi: x / Non Sq Epi: x / Bacteria: x          MICROBIOLOGY      RADIOLOGY & ADDITIONAL TESTS:  Reviewed OVERNIGHT EVENTS: SUSANNAH  SUBJECTIVE: Patient was seen and examined at bedside. Pt feeling better. He continues to report cough productive of clear/yellowish sputum and SOB with ambulation (at baseline). He also reports some weakness that waxes and wanes. He denies fever/chills, CP, abd pain, N/V, diarrhea, or dysuria.     VITAL SIGNS:  T(F): 97.6 (08-14-23 @ 06:06)  HR: 97 (08-14-23 @ 06:06)  BP: 106/70 (08-14-23 @ 06:06)  RR: 18 (08-14-23 @ 06:06)  SpO2: 94% (08-14-23 @ 06:06)  Wt(kg): --    PHYSICAL EXAM  General: NAD, lying in bed w/ 2L NC  Eyes: EOMI intact bilaterally; Anicteric sclerae  HENT: Moist mucous membranes  Lungs: Pt coughing, Diminished lung sounds, CTA B/L. No wheezes, rales, or rhonchi  Cardiovascular: RRR. No murmurs, rubs, or gallops  Abdomen: Soft, non-tender non-distended; No rebound or guarding  Extremities: no calf tenderness b/l; No clubbing, cyanosis or edema  Neurological: Alert and oriented x3, 5/5 in upper and lower extremities  Skin: Warm and dry. No obvious rash    MEDICATIONS  (STANDING):  apixaban 2.5 milliGRAM(s) Oral every 12 hours  atorvastatin 80 milliGRAM(s) Oral at bedtime  benzonatate 100 milliGRAM(s) Oral three times a day  guaifenesin/dextromethorphan Oral Liquid 10 milliLiter(s) Oral every 4 hours  metoprolol tartrate 25 milliGRAM(s) Oral every 12 hours  sodium chloride 0.9%. 1000 milliLiter(s) (80 mL/Hr) IV Continuous <Continuous>  tamsulosin 0.4 milliGRAM(s) Oral at bedtime    MEDICATIONS  (PRN):  albuterol/ipratropium for Nebulization 3 milliLiter(s) Nebulizer every 6 hours PRN Shortness of Breath and/or Wheezing      Allergies    penicillin (Rash)    Intolerances    potassium chloride (Other)      LABS:                        13.7   18.99 )-----------( 193      ( 13 Aug 2023 05:30 )             41.3     08-13    137  |  104  |  45<H>  ----------------------------<  156<H>  4.6   |  19<L>  |  1.31<H>    Ca    8.7      13 Aug 2023 05:30  Phos  3.3     08-13  Mg     1.9     08-13    TPro  6.2  /  Alb  3.6  /  TBili  0.4  /  DBili  x   /  AST  118<H>  /  ALT  107<H>  /  AlkPhos  115  08-13      Urinalysis Basic - ( 13 Aug 2023 05:30 )    Color: x / Appearance: x / SG: x / pH: x  Gluc: 156 mg/dL / Ketone: x  / Bili: x / Urobili: x   Blood: x / Protein: x / Nitrite: x   Leuk Esterase: x / RBC: x / WBC x   Sq Epi: x / Non Sq Epi: x / Bacteria: x    MICROBIOLOGY  Culture - Blood (08.11.23 @ 15:17)    Specimen Source: .Blood Blood-Peripheral   Culture Results:   No growth at 48 Hours    Culture - Blood (08.11.23 @ 15:17)    Specimen Source: .Blood Blood-Peripheral   Culture Results:   No growth at 48 Hours    Culture - Urine (08.11.23 @ 14:53)    Specimen Source: Clean Catch Clean Catch (Midstream)   Culture Results:   <10,000 CFU/mL Normal Urogenital Melita    RADIOLOGY & ADDITIONAL TESTS:  Reviewed

## 2023-08-15 PROCEDURE — 71045 X-RAY EXAM CHEST 1 VIEW: CPT | Mod: 26

## 2023-08-15 RX ADMIN — Medication 10 MILLILITER(S): at 10:24

## 2023-08-15 RX ADMIN — Medication 3 MILLILITER(S): at 04:12

## 2023-08-15 RX ADMIN — Medication 25 MILLIGRAM(S): at 21:33

## 2023-08-15 RX ADMIN — Medication 10 MILLILITER(S): at 06:37

## 2023-08-15 RX ADMIN — TAMSULOSIN HYDROCHLORIDE 0.4 MILLIGRAM(S): 0.4 CAPSULE ORAL at 21:33

## 2023-08-15 RX ADMIN — Medication 25 MILLIGRAM(S): at 10:24

## 2023-08-15 RX ADMIN — APIXABAN 2.5 MILLIGRAM(S): 2.5 TABLET, FILM COATED ORAL at 21:33

## 2023-08-15 RX ADMIN — APIXABAN 2.5 MILLIGRAM(S): 2.5 TABLET, FILM COATED ORAL at 10:25

## 2023-08-15 NOTE — PROGRESS NOTE ADULT - PROBLEM SELECTOR PLAN 2
BUN/Cr 46, 2.15 on admission, likely pre-renal. UA showing protein and RBC/blood. History of renal cancer and rt partial nephrectomy. Downtrending while on the floors.  - trend Cr  - encourage PO intake
BUN/Cr 46, 2.15 on admission. History of renal cancer and rt partial nephrectomy. Downtrending (2.15 --> 1.81) s/p 500cc NS in ED. Continued to downtrend on floors.  suspect pre-renal in nature. no known kidney disease, however, UA showing protein and RBC/blood.   - trend Cr  - encourage PO intake
BUN/Cr 46, 2.15 on admission, likely pre-renal. UA showing protein and RBC/blood. History of renal cancer and rt partial nephrectomy. Downtrending while on the floors.  - trend Cr  - encourage PO intake

## 2023-08-15 NOTE — PROGRESS NOTE ADULT - PROBLEM SELECTOR PLAN 5
UA >10 RBC and large blood in urine likely in the setting of previous renal cancer. Pt is not anemic so there is no indication to hold AC at this time.   - Trend CBC   - Continue w/ home Eliquis 2.5 mg BID
Former smoker. On home 2LNC, no evidence of hypoxia. Ambulatory O2 sat 93% on 2L NC.  - continue home 2LNC (goal O2 is 88-92%)  - ramiro FOSTERN
UA >10 RBC and large blood in urine likely in the setting of previous renal cancer. Pt is not anemic so there is no indication to hold AC at this time.   - Trend CBC   - Continue w/ home Eliquis 2.5 mg BID

## 2023-08-15 NOTE — PROGRESS NOTE ADULT - PROBLEM SELECTOR PLAN 1
Pt complaining of fatigue for the past week and dyspnea on exertion for the past 3 days. Also with nonproductive cough. RSV+.  - supportive care  - duonebs PRN  - Robitussin DM q4 hr for cough  - no indication for steroids at this time

## 2023-08-15 NOTE — PROGRESS NOTE ADULT - PROBLEM SELECTOR PLAN 4
Pt with new transaminitis. Hep C negative. Unclear etiology. Likely iatrogenic. Now downtrending.   - trend w/ CMP
History of one week of weakness and fatigue. Pt appears malnourished. States he completes ADLs w/o assistance. Uses a shopping cart to help steady his walking when doing errands. Pt recs home with home PT  - Encourage PO intake
Pt with new transaminitis. Hep C negative. Unclear etiology. Likely iatrogenic. Now downtrending.   - trend w/ CMP

## 2023-08-15 NOTE — PROGRESS NOTE ADULT - PROBLEM SELECTOR PLAN 3
UA >10 RBC and large blood in urine likely in the setting of previous renal cancer. Pt is not anemic so there is no indication to hold AC at this time.   - Trend CBC   - Continue w/ home Eliquis 2.5 mg BID
History of one week of weakness and fatigue. Pt appears malnourished. States he completes ADLs w/o assistance. Uses a shopping cart to help steady his walking when doing errands.   - Encourage PO intake  - PT recs [facility]
History of one week of weakness and fatigue. Pt appears malnourished. States he completes ADLs w/o assistance. Uses a shopping cart to help steady his walking when doing errands. Pt recs home with home PT  - Encourage PO intake

## 2023-08-15 NOTE — PROGRESS NOTE ADULT - PROBLEM SELECTOR PLAN 10
F: 1000 mL NS 80 cc/hr 24 hr  E: Replete K<4, Mg<2  N: DASH diet  VTE Ppx: Eliquis 2.5mg BID  GI: not needed  C: Full Code    Problem: Nutrition, metabolism, and development symptoms
- continue home tamsulosin 0.4mg daily  - bladder scan to r/o obstruction
- continue home tamsulosin 0.4mg daily  - bladder scan to r/o obstruction

## 2023-08-15 NOTE — DISCHARGE NOTE NURSING/CASE MANAGEMENT/SOCIAL WORK - PATIENT PORTAL LINK FT
You can access the FollowMyHealth Patient Portal offered by [facility] by registering at the following website: [email or web address]. By joining [email or web address]’s FollowMyHealth portal, you will also be able to view your health information using other applications (apps) compatible with our system.

## 2023-08-15 NOTE — PROGRESS NOTE ADULT - PROBLEM SELECTOR PLAN 7
Known PMH AFib, rate controlled.  - continue home Eliquis 2.5mg BID  - continue home metoprolol tartrate 25mg BID
Previously on Losartan, however patient says this was discontinued. Follow up with Cardiologist Dr. Olegario Preston.  - c/w home lopressor PO  - collateral from PCP if continues to be hypertensive
Known PMH AFib, rate controlled.  - continue home Eliquis 2.5mg BID  - continue home metoprolol tartrate 25mg BID

## 2023-08-15 NOTE — PROGRESS NOTE ADULT - PROBLEM SELECTOR PLAN 9
- continue home atorvastatin 80mg daily
- continue home atorvastatin 80mg daily
- continue home tamsulosin 0.4mg daily  - bladder scan to r/o obstruction

## 2023-08-15 NOTE — PROGRESS NOTE ADULT - PROBLEM SELECTOR PROBLEM 1
Respiratory syncytial virus (RSV)

## 2023-08-15 NOTE — PROGRESS NOTE ADULT - PROBLEM SELECTOR PLAN 6
Former smoker. On home 2LNC, no evidence of hypoxia. Ambulatory O2 sat 93% on 2L NC.  - continue home 2LNC (goal O2 is 88-92%)  - ramiro FOSTERN
Known PMH AFib, rate controlled.  - continue home Eliquis 2.5mg BID  - continue home metoprolol tartrate 25mg BID
Former smoker. On home 2LNC, no evidence of hypoxia. Ambulatory O2 sat 93% on 2L NC. RLL crackles on exam today, CXR w/o new consolidation. Per pt, hx of crackles on that side 2/2 to scarring from previous PNA vs. atelectasis.   - continue home 2LNC (goal O2 is 88-92%)  - ramiro FOSTERN

## 2023-08-15 NOTE — PROGRESS NOTE ADULT - NS ATTEST RISK PROBLEM GEN_ALL_CORE FT
chronic hypoxic respiratory failure  BONNY in the setting of hypovolemia  need for oxygen supplementaion  RSV infections

## 2023-08-15 NOTE — PROGRESS NOTE ADULT - ATTENDING COMMENTS
Patient is 77 yo man with A. Fib on AC, COPD on 2 L of Oxygen via NC, HTN, Renal Ca s/p partial R nephrectomy, BPH, admitted with complains of fatigue and decreased PO intake.   ----for asymptomatic RSV will cont with supportive care. Guaphenesin DM is added to patient's regimen  ---hypoxic respiratory failure due to COPD, cont with supplemental oxygen and bronchodilators. Pt is noted not to desaturate on exertion while using portable oxygen  ----for BONNY improved with IV hydration. No bowel obstruction as per bladder scan  ----A. Fib on AC and beta blockers.   ----PT deemed pt to be safe for home dc with home PT. However patient states that he does not feel well and wants to stay another day in the hospital for optimization of his functional status. Will re-assess tomorrow
Patient is 79 yo man with A. Fib on AC, COPD on 2 L of Oxygen via NC, HTN, Renal Ca s/p partial R nephrectomy, BPH, admitted with complains of fatigue and decreased PO intake.   ----for asymptomatic RSV will cont with supportive care. Guaphenesin DM is added to patient's regimen  ---hypoxic respiratory failure due to COPD, cont with supplemental oxygen and bronchodilators. Pt is noted not to desaturate on exertion while using portable oxygen  ----A. Fib on AC and beta blockers.   ----PT deemed pt suitable for [facility]. Pt left on 8/15/23
Patient is 79 yo man with A. Fib on AC, COPD on 2 L of Oxygen via NC, HTN, Renal Ca s/p partial R nephrectomy, BPH, admitted with complains of fatigue and decreased PO intake.   ----for asymptomatic RSV will cont with supportive care. Guaphenesin DM is added to patient's regimen  ---hypoxic respiratory failure due to COPD, cont with supplemental oxygen and bronchodilators. Pt is noted not to desaturate on exertion while using portable oxygen  ----A. Fib on AC and beta blockers.   ----PT deemed pt to be safe for home dc with home PT. However patient continue to say that  he does not feel well. Patient was offered an option to consider short term rehab on discharge. Will need to get PT re-assessment if patient agrees to rehab

## 2023-08-15 NOTE — PROGRESS NOTE ADULT - ASSESSMENT
77 y/o Male with PMHx of Afib(on Eliquis and metoprolol), HLD, COPD (on 2L nasal canula), renal cancer s/p partial rt nephrectomy and HTN presenting today c/o feeling weak for the past week admitted for further workup for weakness and management of RSV. Patient is now medically ready for discharge to [facility]. 
79 y/o Male with PMHx of Afib(on Eliquis and metoprolol), HLD, COPD (on 2L nasal canula), renal cancer s/p partial rt nephrectomy and HTN presenting today c/o feeling weak for the past week admitted for further workup for weakness and management of RSV. Sandrine is now medically ready for discharge.
77 y/o Male with PMHx of Afib(on Eliquis and metoprolol), HLD, COPD (on 2L nasal canula), renal cancer s/p partial rt nephrectomy and HTN presenting today c/o feeling weak for the past week admitted for further workup for weakness and management of RSV. Patient is now medically ready for discharge.

## 2023-08-15 NOTE — PROGRESS NOTE ADULT - PROBLEM SELECTOR PLAN 11
F: off fluids  E: Replete K<4, Mg<2  N: DASH diet  VTE Ppx: Eliquis 2.5mg BID  GI: not needed  C: Full Code  Dispo: [facility]    Problem: Nutrition, metabolism, and development symptoms
F: off fluids  E: Replete K<4, Mg<2  N: DASH diet  VTE Ppx: Eliquis 2.5mg BID  GI: not needed  C: Full Code  Dispo: plan for dc to home, though patient refusing, dc notice given    Problem: Nutrition, metabolism, and development symptoms

## 2023-08-15 NOTE — DISCHARGE NOTE NURSING/CASE MANAGEMENT/SOCIAL WORK - NSDCPEFALRISK_GEN_ALL_CORE
For information on Fall & Injury Prevention, visit: [email or web address] OR  [email or web address] OR  https://www.cdc.gov/steadi/patient.html

## 2023-08-15 NOTE — PROGRESS NOTE ADULT - PROBLEM SELECTOR PROBLEM 6
COPD (chronic obstructive pulmonary disease)
COPD (chronic obstructive pulmonary disease)
Atrial fibrillation

## 2023-08-15 NOTE — DISCHARGE NOTE NURSING/CASE MANAGEMENT/SOCIAL WORK - NSDCCRNAME_GEN_ALL_CORE_FT
[facility]  Address: [address], [place] [address]

## 2023-08-15 NOTE — PROGRESS NOTE ADULT - SUBJECTIVE AND OBJECTIVE BOX
OVERNIGHT EVENTS:  SUBJECTIVE: Patient was seen and examined at bedside.      VITAL SIGNS:  T(F): 97.2 (08-15-23 @ 06:08)  HR: 99 (08-15-23 @ 06:08)  BP: 127/76 (08-15-23 @ 06:08)  RR: 19 (08-15-23 @ 06:08)  SpO2: 100% (08-15-23 @ 06:08)  Wt(kg): --    PHYSICAL EXAM  GENERAL: NAD, lying in bed comfortably  HEAD:  Atraumatic, normocephalic  EYES: EOMI, PERRLA, conjunctiva and sclera clear  ENT: Moist mucous membranes  NECK: Supple, no JVD  HEART: Regular rate and rhythm, no murmurs, rubs, or gallops  LUNGS: Unlabored respirations.  Clear to auscultation bilaterally, no crackles, wheezing, or rhonchi  ABDOMEN: Soft, nontender, nondistended, +BS  EXTREMITIES: 2+ peripheral pulses bilaterally. No clubbing, cyanosis, or edema  NERVOUS SYSTEM:  A&Ox3, no focal deficits   SKIN: No rashes or lesions    MEDICATIONS  (STANDING):  apixaban 2.5 milliGRAM(s) Oral every 12 hours  guaifenesin/dextromethorphan Oral Liquid 10 milliLiter(s) Oral every 4 hours  metoprolol tartrate 25 milliGRAM(s) Oral every 12 hours  tamsulosin 0.4 milliGRAM(s) Oral at bedtime    MEDICATIONS  (PRN):  albuterol/ipratropium for Nebulization 3 milliLiter(s) Nebulizer every 6 hours PRN Shortness of Breath and/or Wheezing      Allergies    penicillin (Rash)    Intolerances    potassium chloride (Other)      LABS:                        12.7   12.02 )-----------( 174      ( 14 Aug 2023 10:41 )             39.0     08-14    137  |  105  |  35<H>  ----------------------------<  177<H>  4.3   |  22  |  1.17    Ca    8.9      14 Aug 2023 10:41  Phos  2.2     08-14  Mg     1.7     08-14    TPro  6.5  /  Alb  3.5  /  TBili  0.7  /  DBili  x   /  AST  70<H>  /  ALT  108<H>  /  AlkPhos  103  08-14      Urinalysis Basic - ( 14 Aug 2023 10:41 )    Color: x / Appearance: x / SG: x / pH: x  Gluc: 177 mg/dL / Ketone: x  / Bili: x / Urobili: x   Blood: x / Protein: x / Nitrite: x   Leuk Esterase: x / RBC: x / WBC x   Sq Epi: x / Non Sq Epi: x / Bacteria: x          MICROBIOLOGY      RADIOLOGY & ADDITIONAL TESTS:  Reviewed OVERNIGHT EVENTS: SUSANNAH  SUBJECTIVE: Patient was seen and examined at bedside. Pt notes improvement in cough, denies CP or SOB while sitting in bed. He denies N/V, abd pain, diarrhea, dysuria, headache, lightheadedness or weakness.     VITAL SIGNS:  T(F): 97.2 (08-15-23 @ 06:08)  HR: 99 (08-15-23 @ 06:08)  BP: 127/76 (08-15-23 @ 06:08)  RR: 19 (08-15-23 @ 06:08)  SpO2: 100% (08-15-23 @ 06:08)  Wt(kg): --    PHYSICAL EXAM  General: NAD, lying in bed w/ 2L NC  Eyes: EOMI intact bilaterally; Anicteric sclerae  HENT: Moist mucous membranes  Lungs: Pt coughing, crackles in RLL, otherwise clear to auscultation  Cardiovascular: RRR. No murmurs, rubs, or gallops  Abdomen: Soft, non-tender non-distended; No rebound or guarding  Extremities: no calf tenderness b/l; No clubbing, cyanosis or edema  Neurological: Alert and oriented x3, 5/5 in upper and lower extremities  Skin: Warm and dry. No obvious rash    MEDICATIONS  (STANDING):  apixaban 2.5 milliGRAM(s) Oral every 12 hours  guaifenesin/dextromethorphan Oral Liquid 10 milliLiter(s) Oral every 4 hours  metoprolol tartrate 25 milliGRAM(s) Oral every 12 hours  tamsulosin 0.4 milliGRAM(s) Oral at bedtime    MEDICATIONS  (PRN):  albuterol/ipratropium for Nebulization 3 milliLiter(s) Nebulizer every 6 hours PRN Shortness of Breath and/or Wheezing      Allergies    penicillin (Rash)    Intolerances    potassium chloride (Other)      LABS:                        12.7   12.02 )-----------( 174      ( 14 Aug 2023 10:41 )             39.0     08-14    137  |  105  |  35<H>  ----------------------------<  177<H>  4.3   |  22  |  1.17    Ca    8.9      14 Aug 2023 10:41  Phos  2.2     08-14  Mg     1.7     08-14    TPro  6.5  /  Alb  3.5  /  TBili  0.7  /  DBili  x   /  AST  70<H>  /  ALT  108<H>  /  AlkPhos  103  08-14      Urinalysis Basic - ( 14 Aug 2023 10:41 )    Color: x / Appearance: x / SG: x / pH: x  Gluc: 177 mg/dL / Ketone: x  / Bili: x / Urobili: x   Blood: x / Protein: x / Nitrite: x   Leuk Esterase: x / RBC: x / WBC x   Sq Epi: x / Non Sq Epi: x / Bacteria: x          MICROBIOLOGY      RADIOLOGY & ADDITIONAL TESTS:  Reviewed

## 2023-08-15 NOTE — PROGRESS NOTE ADULT - PROBLEM SELECTOR PLAN 8
Previously on Losartan, however patient says this was discontinued. Follow up with Cardiologist Dr. Olegario Preston.  - c/w home lopressor PO  - collateral from PCP if continues to be hypertensive
Previously on Losartan, however patient says this was discontinued. Follow up with Cardiologist Dr. Olegario Preston.  - c/w home lopressor PO  - collateral from PCP if continues to be hypertensive
- continue home atorvastatin 80mg daily

## 2023-08-16 VITALS
RESPIRATION RATE: 18 BRPM | OXYGEN SATURATION: 93 % | TEMPERATURE: 98 F | HEART RATE: 98 BPM | DIASTOLIC BLOOD PRESSURE: 85 MMHG | SYSTOLIC BLOOD PRESSURE: 141 MMHG

## 2023-08-16 PROCEDURE — 97161 PT EVAL LOW COMPLEX 20 MIN: CPT

## 2023-08-16 PROCEDURE — 81001 URINALYSIS AUTO W/SCOPE: CPT

## 2023-08-16 PROCEDURE — 36415 COLL VENOUS BLD VENIPUNCTURE: CPT

## 2023-08-16 PROCEDURE — 97116 GAIT TRAINING THERAPY: CPT

## 2023-08-16 PROCEDURE — 71045 X-RAY EXAM CHEST 1 VIEW: CPT

## 2023-08-16 PROCEDURE — 83735 ASSAY OF MAGNESIUM: CPT

## 2023-08-16 PROCEDURE — 83880 ASSAY OF NATRIURETIC PEPTIDE: CPT

## 2023-08-16 PROCEDURE — 87637 SARSCOV2&INF A&B&RSV AMP PRB: CPT

## 2023-08-16 PROCEDURE — 86803 HEPATITIS C AB TEST: CPT

## 2023-08-16 PROCEDURE — 80048 BASIC METABOLIC PNL TOTAL CA: CPT

## 2023-08-16 PROCEDURE — 99232 SBSQ HOSP IP/OBS MODERATE 35: CPT | Mod: GC

## 2023-08-16 PROCEDURE — 87040 BLOOD CULTURE FOR BACTERIA: CPT

## 2023-08-16 PROCEDURE — 80053 COMPREHEN METABOLIC PANEL: CPT

## 2023-08-16 PROCEDURE — 83690 ASSAY OF LIPASE: CPT

## 2023-08-16 PROCEDURE — 70450 CT HEAD/BRAIN W/O DYE: CPT

## 2023-08-16 PROCEDURE — 84484 ASSAY OF TROPONIN QUANT: CPT

## 2023-08-16 PROCEDURE — 87086 URINE CULTURE/COLONY COUNT: CPT

## 2023-08-16 PROCEDURE — 94640 AIRWAY INHALATION TREATMENT: CPT

## 2023-08-16 PROCEDURE — 96374 THER/PROPH/DIAG INJ IV PUSH: CPT

## 2023-08-16 PROCEDURE — 84100 ASSAY OF PHOSPHORUS: CPT

## 2023-08-16 PROCEDURE — 99285 EMERGENCY DEPT VISIT HI MDM: CPT | Mod: 25

## 2023-08-16 PROCEDURE — 96375 TX/PRO/DX INJ NEW DRUG ADDON: CPT

## 2023-08-16 PROCEDURE — 93005 ELECTROCARDIOGRAM TRACING: CPT

## 2023-08-16 PROCEDURE — 85025 COMPLETE CBC W/AUTO DIFF WBC: CPT

## 2023-08-16 RX ADMIN — Medication 25 MILLIGRAM(S): at 09:00

## 2023-08-16 RX ADMIN — Medication 10 MILLILITER(S): at 06:58

## 2023-08-16 RX ADMIN — APIXABAN 2.5 MILLIGRAM(S): 2.5 TABLET, FILM COATED ORAL at 09:00

## 2023-08-16 RX ADMIN — Medication 3 MILLILITER(S): at 16:18

## 2023-08-16 RX ADMIN — Medication 10 MILLILITER(S): at 09:01

## 2023-08-16 RX ADMIN — Medication 10 MILLILITER(S): at 13:04

## 2023-08-16 NOTE — PROVIDER CONTACT NOTE (OTHER) - SITUATION
Informed provider that patient has no IV access due to it being removed for canceled anticipated discharge.

## 2023-08-17 LAB
CULTURE RESULTS: SIGNIFICANT CHANGE UP
CULTURE RESULTS: SIGNIFICANT CHANGE UP
SPECIMEN SOURCE: SIGNIFICANT CHANGE UP
SPECIMEN SOURCE: SIGNIFICANT CHANGE UP

## 2023-08-31 DIAGNOSIS — Z91.048 OTHER NONMEDICINAL SUBSTANCE ALLERGY STATUS: ICD-10-CM

## 2023-08-31 DIAGNOSIS — I48.91 UNSPECIFIED ATRIAL FIBRILLATION: ICD-10-CM

## 2023-08-31 DIAGNOSIS — N40.0 BENIGN PROSTATIC HYPERPLASIA WITHOUT LOWER URINARY TRACT SYMPTOMS: ICD-10-CM

## 2023-08-31 DIAGNOSIS — J06.9 ACUTE UPPER RESPIRATORY INFECTION, UNSPECIFIED: ICD-10-CM

## 2023-08-31 DIAGNOSIS — N17.9 ACUTE KIDNEY FAILURE, UNSPECIFIED: ICD-10-CM

## 2023-08-31 DIAGNOSIS — I10 ESSENTIAL (PRIMARY) HYPERTENSION: ICD-10-CM

## 2023-08-31 DIAGNOSIS — Z88.0 ALLERGY STATUS TO PENICILLIN: ICD-10-CM

## 2023-08-31 DIAGNOSIS — B97.4 RESPIRATORY SYNCYTIAL VIRUS AS THE CAUSE OF DISEASES CLASSIFIED ELSEWHERE: ICD-10-CM

## 2023-08-31 DIAGNOSIS — Z99.81 DEPENDENCE ON SUPPLEMENTAL OXYGEN: ICD-10-CM

## 2023-08-31 DIAGNOSIS — J44.9 CHRONIC OBSTRUCTIVE PULMONARY DISEASE, UNSPECIFIED: ICD-10-CM

## 2023-08-31 DIAGNOSIS — J96.11 CHRONIC RESPIRATORY FAILURE WITH HYPOXIA: ICD-10-CM

## 2023-08-31 DIAGNOSIS — E43 UNSPECIFIED SEVERE PROTEIN-CALORIE MALNUTRITION: ICD-10-CM

## 2024-01-22 NOTE — PATIENT PROFILE ADULT - FUNCTIONAL SCREEN CURRENT LEVEL: COMMUNICATION, MLM
0 = understands/communicates without difficulty
UNKNOWN
normal strength/alert and oriented x 3/responds to verbal commands/responds to pain/sensation intact